# Patient Record
Sex: MALE | Race: WHITE | Employment: FULL TIME | URBAN - NONMETROPOLITAN AREA
[De-identification: names, ages, dates, MRNs, and addresses within clinical notes are randomized per-mention and may not be internally consistent; named-entity substitution may affect disease eponyms.]

---

## 2021-05-31 ENCOUNTER — HOSPITAL ENCOUNTER (EMERGENCY)
Facility: HOSPITAL | Age: 21
Discharge: HOME | End: 2021-06-01
Attending: PHYSICIAN ASSISTANT | Admitting: PHYSICIAN ASSISTANT
Payer: COMMERCIAL

## 2021-05-31 VITALS
SYSTOLIC BLOOD PRESSURE: 129 MMHG | TEMPERATURE: 98 F | HEART RATE: 102 BPM | DIASTOLIC BLOOD PRESSURE: 78 MMHG | OXYGEN SATURATION: 97 % | RESPIRATION RATE: 16 BRPM

## 2021-05-31 DIAGNOSIS — F43.23 ADJUSTMENT DISORDER WITH MIXED ANXIETY AND DEPRESSED MOOD: ICD-10-CM

## 2021-05-31 DIAGNOSIS — F10.10 ALCOHOL ABUSE: ICD-10-CM

## 2021-05-31 LAB
ALBUMIN SERPL-MCNC: 4.9 G/DL (ref 3.4–5)
ALP SERPL-CCNC: 75 U/L (ref 40–150)
ALT SERPL W P-5'-P-CCNC: 25 U/L (ref 0–70)
AMPHETAMINES UR QL: NOT DETECTED NG/ML
ANION GAP SERPL CALCULATED.3IONS-SCNC: 13 MMOL/L (ref 3–14)
APAP SERPL-MCNC: <2 MG/L (ref 10–20)
AST SERPL W P-5'-P-CCNC: 23 U/L (ref 0–45)
BARBITURATES UR QL SCN: NOT DETECTED NG/ML
BENZODIAZ UR QL SCN: NOT DETECTED NG/ML
BILIRUB SERPL-MCNC: 0.2 MG/DL (ref 0.2–1.3)
BUN SERPL-MCNC: 14 MG/DL (ref 7–30)
BUPRENORPHINE UR QL: NOT DETECTED NG/ML
CALCIUM SERPL-MCNC: 9.6 MG/DL (ref 8.5–10.1)
CANNABINOIDS UR QL: ABNORMAL NG/ML
CHLORIDE SERPL-SCNC: 110 MMOL/L (ref 94–109)
CO2 SERPL-SCNC: 21 MMOL/L (ref 20–32)
COCAINE UR QL SCN: NOT DETECTED NG/ML
CREAT SERPL-MCNC: 1.17 MG/DL (ref 0.66–1.25)
D-METHAMPHET UR QL: NOT DETECTED NG/ML
ETHANOL SERPL-MCNC: 0.24 G/DL
GFR SERPL CREATININE-BSD FRML MDRD: 88 ML/MIN/{1.73_M2}
GLUCOSE SERPL-MCNC: 93 MG/DL (ref 70–99)
METHADONE UR QL SCN: NOT DETECTED NG/ML
OPIATES UR QL SCN: NOT DETECTED NG/ML
OXYCODONE UR QL SCN: NOT DETECTED NG/ML
PCP UR QL SCN: NOT DETECTED NG/ML
POTASSIUM SERPL-SCNC: 3.6 MMOL/L (ref 3.4–5.3)
PROPOXYPH UR QL: NOT DETECTED NG/ML
PROT SERPL-MCNC: 8.5 G/DL (ref 6.8–8.8)
SALICYLATES SERPL-MCNC: 2 MG/DL
SODIUM SERPL-SCNC: 144 MMOL/L (ref 133–144)
TRICYCLICS UR QL SCN: NOT DETECTED NG/ML
TSH SERPL DL<=0.005 MIU/L-ACNC: 2.47 MU/L (ref 0.4–4)

## 2021-05-31 PROCEDURE — 84443 ASSAY THYROID STIM HORMONE: CPT | Performed by: PHYSICIAN ASSISTANT

## 2021-05-31 PROCEDURE — 80053 COMPREHEN METABOLIC PANEL: CPT | Performed by: PHYSICIAN ASSISTANT

## 2021-05-31 PROCEDURE — 36415 COLL VENOUS BLD VENIPUNCTURE: CPT | Performed by: PHYSICIAN ASSISTANT

## 2021-05-31 PROCEDURE — 99283 EMERGENCY DEPT VISIT LOW MDM: CPT | Performed by: PHYSICIAN ASSISTANT

## 2021-05-31 PROCEDURE — 80179 DRUG ASSAY SALICYLATE: CPT | Performed by: PHYSICIAN ASSISTANT

## 2021-05-31 PROCEDURE — 250N000013 HC RX MED GY IP 250 OP 250 PS 637: Performed by: PHYSICIAN ASSISTANT

## 2021-05-31 PROCEDURE — 99283 EMERGENCY DEPT VISIT LOW MDM: CPT

## 2021-05-31 PROCEDURE — 82077 ASSAY SPEC XCP UR&BREATH IA: CPT | Performed by: PHYSICIAN ASSISTANT

## 2021-05-31 PROCEDURE — 80306 DRUG TEST PRSMV INSTRMNT: CPT | Performed by: PHYSICIAN ASSISTANT

## 2021-05-31 PROCEDURE — 80143 DRUG ASSAY ACETAMINOPHEN: CPT | Performed by: PHYSICIAN ASSISTANT

## 2021-05-31 RX ORDER — NICOTINE 21 MG/24HR
1 PATCH, TRANSDERMAL 24 HOURS TRANSDERMAL DAILY
Status: DISCONTINUED | OUTPATIENT
Start: 2021-05-31 | End: 2021-06-01 | Stop reason: HOSPADM

## 2021-05-31 RX ADMIN — NICOTINE 1 PATCH: 21 PATCH, EXTENDED RELEASE TRANSDERMAL at 19:46
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
## 2021-06-01 NOTE — ED NOTES
Patient is awake and dressed. Continues to deny suicidal ideation. Patient is tearful and states he has family and wouldn't want to leave this earth. Patient states he plans to follow up with counseling as it has been helpful in the past. Awaiting ride. VSS.

## 2021-06-08 ENCOUNTER — HOSPITAL ENCOUNTER (EMERGENCY)
Facility: HOSPITAL | Age: 21
Discharge: HOME | End: 2021-06-08
Attending: PHYSICIAN ASSISTANT | Admitting: PHYSICIAN ASSISTANT
Payer: COMMERCIAL

## 2021-06-08 ENCOUNTER — APPOINTMENT (OUTPATIENT)
Dept: GENERAL RADIOLOGY | Facility: HOSPITAL | Age: 21
End: 2021-06-08
Attending: NURSE PRACTITIONER
Payer: COMMERCIAL

## 2021-06-08 VITALS
RESPIRATION RATE: 14 BRPM | HEART RATE: 62 BPM | DIASTOLIC BLOOD PRESSURE: 67 MMHG | TEMPERATURE: 97.7 F | OXYGEN SATURATION: 98 % | SYSTOLIC BLOOD PRESSURE: 118 MMHG

## 2021-06-08 DIAGNOSIS — M79.641 PAIN OF RIGHT HAND: ICD-10-CM

## 2021-06-08 PROCEDURE — G0463 HOSPITAL OUTPT CLINIC VISIT: HCPCS

## 2021-06-08 PROCEDURE — 73130 X-RAY EXAM OF HAND: CPT | Mod: RT

## 2021-06-08 PROCEDURE — 99213 OFFICE O/P EST LOW 20 MIN: CPT | Performed by: PHYSICIAN ASSISTANT

## 2021-06-08 NOTE — ED PROVIDER NOTES
History     Chief Complaint   Patient presents with     Hand Pain     rt hand pain x 1 week, notes injury due to punched a wall     The history is provided by the patient.     Raphael Aceves is a 20 year old male who presented to the urgent care ambulatory for evaluation of right hand pain.  Patient reports that he punched a wall approximately 1 week ago and has been experiencing pain across the dorsal aspect of his right hand since that time.  No other concerns.    Allergies:  No Known Allergies    Problem List:    There are no active problems to display for this patient.       Past Medical History:    No past medical history on file.    Past Surgical History:    No past surgical history on file.    Family History:    No family history on file.    Social History:  Marital Status:  Single [1]  Social History     Tobacco Use     Smoking status: Not on file   Substance Use Topics     Alcohol use: Not on file     Drug use: Not on file        Medications:    No current outpatient medications on file.        Review of Systems   Musculoskeletal:        Right hand pain   Skin: Negative.        Physical Exam   BP: 118/67  Pulse: 62  Temp: 97.7  F (36.5  C)  Resp: 14  SpO2: 98 %      Physical Exam  Vitals signs and nursing note reviewed.   Constitutional:       General: He is not in acute distress.     Appearance: Normal appearance. He is normal weight. He is not ill-appearing, toxic-appearing or diaphoretic.   Cardiovascular:      Rate and Rhythm: Normal rate and regular rhythm.   Musculoskeletal:      Comments: Semination of the right hand reveals no significant deformity or edema.  There is no erythema.  Some mild increasing tenderness upon palpation of the second and third metacarpals.  Seems to have normal range of motion of the wrist.  Normal range of motion of the digits.  Pulses are intact.   Skin:     General: Skin is warm and dry.      Capillary Refill: Capillary refill takes less than 2 seconds.    Neurological:      General: No focal deficit present.      Mental Status: He is alert and oriented to person, place, and time.         ED Course        Procedures               Critical Care time:  none               Results for orders placed or performed during the hospital encounter of 06/08/21 (from the past 24 hour(s))   XR Hand Right G/E 3 Views    Narrative    PROCEDURE:  XR HAND RT G/E 3 VW    HISTORY: punched a wall    COMPARISON:  None.    TECHNIQUE:  3 views of the right hand were obtained.    FINDINGS:  No fracture or dislocation is identified. The joint spaces  are preserved.        Impression    IMPRESSION: Normal right hand      JOB ARIAS MD       Medications - No data to display    Assessments & Plan (with Medical Decision Making)   Discussed findings on examination.  X-ray of the right hand reveals no evidence of fracture or dislocation.  He will buy a splint over-the-counter and wear it for 7 days.  Please see discharge instructions.  Return here for any other questions or concerns.  Discussed that other injuries may be apparent with a normal x-ray.  Ibuprofen and Tylenol as needed.  Return here as needed.    This document was prepared using a combination of typing and voice generated software.  While every attempt was made for accuracy, spelling and grammatical errors may exist.    I have reviewed the nursing notes.    I have reviewed the findings, diagnosis, plan and need for follow up with the patient.       There are no discharge medications for this patient.      Final diagnoses:   Pain of right hand       6/8/2021   [facility]     Matilde Steel PA-C  06/08/21 1537

## 2021-06-08 NOTE — DISCHARGE INSTRUCTIONS
As we discussed, your x-ray today shows no evidence of fracture or dislocation.  This does not mean that there is no injury.  You can have multiple injuries that are not visible on x-ray.  Splint your wrist and hand for the next 7 days.  No lifting greater than 10 pounds for the next 7 days.  Follow-up in the clinic for persistent pain.  Return here for any new symptoms, worsening symptoms, or other concerns.   Detail Level: Simple Other (Free Text): Healing well minimal drainage RTC in one week for suture removal Note Text (......Xxx Chief Complaint.): This diagnosis correlates with the

## 2021-07-29 ENCOUNTER — HOSPITAL ENCOUNTER (EMERGENCY)
Facility: HOSPITAL | Age: 21
Discharge: HOME | End: 2021-07-29
Attending: EMERGENCY MEDICINE | Admitting: EMERGENCY MEDICINE
Payer: COMMERCIAL

## 2021-07-29 ENCOUNTER — TELEPHONE (OUTPATIENT)
Dept: EMERGENCY MEDICINE | Facility: HOSPITAL | Age: 21
End: 2021-07-29

## 2021-07-29 VITALS
SYSTOLIC BLOOD PRESSURE: 125 MMHG | OXYGEN SATURATION: 96 % | TEMPERATURE: 99.1 F | RESPIRATION RATE: 16 BRPM | HEART RATE: 59 BPM | DIASTOLIC BLOOD PRESSURE: 62 MMHG

## 2021-07-29 DIAGNOSIS — G47.00 INSOMNIA, UNSPECIFIED TYPE: ICD-10-CM

## 2021-07-29 DIAGNOSIS — F41.9 ANXIETY: ICD-10-CM

## 2021-07-29 PROCEDURE — 99282 EMERGENCY DEPT VISIT SF MDM: CPT | Performed by: EMERGENCY MEDICINE

## 2021-07-29 PROCEDURE — 99283 EMERGENCY DEPT VISIT LOW MDM: CPT

## 2021-07-29 PROCEDURE — 250N000013 HC RX MED GY IP 250 OP 250 PS 637: Performed by: EMERGENCY MEDICINE

## 2021-07-29 RX ORDER — HYDROXYZINE PAMOATE 25 MG/1
25 CAPSULE ORAL AT BEDTIME
Qty: 15 CAPSULE | Refills: 0 | Status: SHIPPED | OUTPATIENT
Start: 2021-07-29 | End: 2021-08-27

## 2021-07-29 RX ORDER — HYDROXYZINE HYDROCHLORIDE 25 MG/1
25 TABLET, FILM COATED ORAL ONCE
Status: COMPLETED | OUTPATIENT
Start: 2021-07-29 | End: 2021-07-29

## 2021-07-29 RX ADMIN — HYDROXYZINE HYDROCHLORIDE 25 MG: 25 TABLET, FILM COATED ORAL at 01:42

## 2021-07-29 NOTE — ED TRIAGE NOTES
Pt here for 12 year history of insomnia. Pt has new job and is worried this insomnia is going to cause issues with keeping this job. Pt is here looking for medicine to help him sleep tonight.

## 2021-07-29 NOTE — ED PROVIDER NOTES
History     Chief Complaint   Patient presents with     Insomnia     HPI  Raphael Aceves is a 20 year old male who presents with insomnia.  He has a history of anxiety, history of substance use disorder in remission.  He reports that he has had insomnia for many years and has tried multiple different medications for it.  He is recently moved to the area and has a new job but had to call in today as he was unable to sleep the night before.  He is feeling quite anxious and emotional about the insomnia as he is not sure that it will ever get better.  He reports that before bed he usually watches TV until his girlfriend is ready to go to sleep and then he lies awake for a long time.  He estimates he gets about 4 hours of sleep per night.  He reports he has trouble controlling his thoughts at night, does not think about anything in particular, could be anything.  He reports feeling exhausted but otherwise has no physical complaints or pain, no recent illnesses, fever.  He reports feeling of hopelessness about his insomnia but denies SI/HI, does not feel depressed at this time, does feel emotional about his inability to sleep.  He reports that he recently discontinued smoking marijuana a week ago, denies alcohol use.    Allergies:  No Known Allergies    Problem List:    There are no problems to display for this patient.       Past Medical History:    History reviewed. No pertinent past medical history.    Past Surgical History:    No past surgical history on file.    Family History:    No family history on file.    Social History:  Marital Status:  Single [1]  Social History     Tobacco Use     Smoking status: None   Substance Use Topics     Alcohol use: None     Drug use: None        Medications:    hydrOXYzine (VISTARIL) 25 MG capsule          Review of Systems    Physical Exam   BP: 125/62  Pulse: 59  Temp: 99.1  F (37.3  C)  Resp: 16  SpO2: 96 %      Physical Exam  Constitutional:       General: He is not in  acute distress.     Appearance: He is not ill-appearing.   HENT:      Head: Normocephalic and atraumatic.      Mouth/Throat:      Mouth: Mucous membranes are moist.      Pharynx: Oropharynx is clear.   Eyes:      Conjunctiva/sclera: Conjunctivae normal.      Pupils: Pupils are equal, round, and reactive to light.   Cardiovascular:      Rate and Rhythm: Normal rate and regular rhythm.   Pulmonary:      Effort: Pulmonary effort is normal.      Breath sounds: Normal breath sounds.   Abdominal:      Palpations: Abdomen is soft.      Tenderness: There is no abdominal tenderness.   Musculoskeletal:      Cervical back: Normal range of motion.      Comments: 5/5 strength upper and lower extremities    Skin:     General: Skin is warm and dry.   Neurological:      Mental Status: He is alert and oriented to person, place, and time.      Comments: Cranial nerves grossly intact, gait steady         ED Course        Procedures              Critical Care time:  none               No results found for this or any previous visit (from the past 24 hour(s)).    Medications   hydrOXYzine (ATARAX) tablet 25 mg (25 mg Oral Given 7/29/21 0142)       Assessments & Plan (with Medical Decision Making)     I have reviewed the nursing notes.    I have reviewed the findings, diagnosis, plan and need for follow up with the patient.   Mr. Aceves is a 20-year-old man who presents with long-term insomnia and associated anxieties about his job.  Had a long discussion about previous attempts to help with his insomnia.  Discussed sleep hygiene including avoiding screens 2 hours before bed, avoiding stimulants, pursuing light exercise, keeping room dark, and restarting melatonin.  He was initially somewhat upset about this because he says he has tried these things in the past, though he has not tried sleep hygiene at home, only when he is in inpatient settings.  We discussed that these were the necessary first steps in addressing insomnia, that he  may require counseling and cognitive behavioral therapy due to his history of anxiety and difficulty controlling his thoughts at night, history of some trauma.  He is interested in this and would like outpatient resources which we can provide.  He does not have a primary care provider so will contact social work to establish care.  Will provide primary care referral.  Discussed that medications are not a long-term solution for insomnia.  Will start with melatonin 1 mg and hydroxyzine once a night 25 mg for first week and then 50 mg if no improvement in the second week.  We also discussed tactical or box breathing for when he is having difficulty sleeping at night.  Primary care provider will address any further recommendations.  Return precautions discussed as detailed in the AVS.  He expressed understanding.    There are no discharge medications for this patient.      Final diagnoses:   Anxiety   Insomnia, unspecified type       7/29/2021   [facility]     Bebe Guardado MD  07/29/21 0224

## 2021-07-29 NOTE — TELEPHONE ENCOUNTER
Care Transitions focused note:      Chart reviewed, staff message to assist patient with establishing primary care.  Appt made with Dr Jaqueline Liriano on Thursday Aug 26th at 1:45 pm.    Message sent to patient via text as he had no voicemail.    Will await call back.    ROC Geronimo

## 2021-07-29 NOTE — ED NOTES
Pt given mental health resources as well as crisis phone numbers. Pt educated on taking atarax and melatonin at bedtime. Message sent to case management to help with PCP.

## 2021-07-29 NOTE — DISCHARGE INSTRUCTIONS
Do not look at screens of any kind for 2 hours prior to bed.  Do not bring any screens in your bedroom.  Do not have a TV in your bedroom.  You should get 30 minutes of moderate exercise per day.  This should be at least 3 hours prior to going to bed.  You should avoid caffeine  You should not start using alcohol  Your room should be completely dark--use blackout curtains or cover your windows with tinfoil.  You can use a sleep mask and earplugs.    Use mental tricks to stop your mind from spinning.  You can do things like picturing your thoughts going into a balloon and floating away.  You can also use combat breathing which is breathing in for 4 seconds, holding for 4 seconds, breathing out for 4 seconds, and holding for 4 seconds.  This can help slow your thoughts and help you relax  You should start taking melatonin every night 30 minutes to an hour before you go to bed.  Start with 1 mg per night  You can take Atarax nightly for the next week.  This will make you sleepy.  Do not combine with alcohol  Follow-up with your primary doctor soon as you can.  I will place referral.  Seek outpatient mental health counseling for anxiety.  Anxiety medications may benefit you.    Return to the emergency department for any new or concerning symptoms

## 2021-08-27 ENCOUNTER — HOSPITAL ENCOUNTER (EMERGENCY)
Facility: HOSPITAL | Age: 21
Discharge: HOME | End: 2021-08-27
Attending: EMERGENCY MEDICINE | Admitting: EMERGENCY MEDICINE
Payer: COMMERCIAL

## 2021-08-27 VITALS
OXYGEN SATURATION: 98 % | DIASTOLIC BLOOD PRESSURE: 86 MMHG | TEMPERATURE: 96.3 F | RESPIRATION RATE: 16 BRPM | HEART RATE: 81 BPM | SYSTOLIC BLOOD PRESSURE: 150 MMHG

## 2021-08-27 DIAGNOSIS — K52.9 GASTROENTERITIS: ICD-10-CM

## 2021-08-27 PROCEDURE — 250N000011 HC RX IP 250 OP 636: Performed by: EMERGENCY MEDICINE

## 2021-08-27 PROCEDURE — 99283 EMERGENCY DEPT VISIT LOW MDM: CPT

## 2021-08-27 PROCEDURE — 99283 EMERGENCY DEPT VISIT LOW MDM: CPT | Performed by: EMERGENCY MEDICINE

## 2021-08-27 RX ORDER — ONDANSETRON 4 MG/1
4 TABLET, ORALLY DISINTEGRATING ORAL ONCE
Status: COMPLETED | OUTPATIENT
Start: 2021-08-27 | End: 2021-08-27

## 2021-08-27 RX ORDER — ONDANSETRON 4 MG/1
4 TABLET, ORALLY DISINTEGRATING ORAL EVERY 8 HOURS PRN
Qty: 10 TABLET | Refills: 0 | Status: SHIPPED | OUTPATIENT
Start: 2021-08-27 | End: 2021-08-30

## 2021-08-27 RX ADMIN — ONDANSETRON 4 MG: 4 TABLET, ORALLY DISINTEGRATING ORAL at 20:49

## 2021-08-27 ASSESSMENT — ENCOUNTER SYMPTOMS
FEVER: 0
CHILLS: 0
COUGH: 0
SHORTNESS OF BREATH: 0

## 2021-08-27 NOTE — LETTER
August 27, 2021      To Whom It May Concern:      Raphael Aceves was seen in our Emergency Department today, 08/27/21.  I expect his condition to improve over the next few days.  He may return to work/school when he is no longer symptomatic.    Sincerely,        Clement Robb MD

## 2021-08-27 NOTE — ED TRIAGE NOTES
Patient presents with complaints of diarrhea for the last 2 weeks. States the nausea and vomiting started today. He's thrown up 4X today

## 2021-08-28 NOTE — ED NOTES
"Pt ambulated to room. Pt states he had thrown up today and felt unwell and had diarrhea today but pt also thinks he had bad food last night causing him to feel unwell this am.  However, pt states the last week he has been having \"the runs\". This is not usual. Pt is Unsure if he has had fevers-does c/o flashes of hot and cold.  Pt does have lower abd discomfort.   Pt is intermittently nauseous. Pt has been in the waiting room for 2 hours and has been able tyo keep a vitamin water and snack bag of chips down well. Pt states he is feeling much better at this time.   "

## 2023-07-23 ENCOUNTER — HOSPITAL ENCOUNTER (EMERGENCY)
Facility: HOSPITAL | Age: 23
Discharge: HOME | End: 2023-07-23
Attending: PHYSICIAN ASSISTANT | Admitting: PHYSICIAN ASSISTANT
Payer: COMMERCIAL

## 2023-07-23 VITALS
HEART RATE: 75 BPM | RESPIRATION RATE: 18 BRPM | SYSTOLIC BLOOD PRESSURE: 123 MMHG | OXYGEN SATURATION: 97 % | TEMPERATURE: 98.3 F | DIASTOLIC BLOOD PRESSURE: 76 MMHG

## 2023-07-23 DIAGNOSIS — F41.9 ANXIETY: ICD-10-CM

## 2023-07-23 PROCEDURE — 99283 EMERGENCY DEPT VISIT LOW MDM: CPT

## 2023-07-23 PROCEDURE — 250N000013 HC RX MED GY IP 250 OP 250 PS 637: Performed by: PHYSICIAN ASSISTANT

## 2023-07-23 PROCEDURE — 99283 EMERGENCY DEPT VISIT LOW MDM: CPT | Performed by: PHYSICIAN ASSISTANT

## 2023-07-23 RX ORDER — HYDROXYZINE HYDROCHLORIDE 25 MG/1
25 TABLET, FILM COATED ORAL ONCE
Status: COMPLETED | OUTPATIENT
Start: 2023-07-23 | End: 2023-07-23

## 2023-07-23 RX ORDER — HYDROXYZINE HYDROCHLORIDE 25 MG/1
25-50 TABLET, FILM COATED ORAL 3 TIMES DAILY PRN
Qty: 15 TABLET | Refills: 0 | Status: SHIPPED | OUTPATIENT
Start: 2023-07-23

## 2023-07-23 RX ADMIN — HYDROXYZINE HYDROCHLORIDE 25 MG: 25 TABLET, FILM COATED ORAL at 21:36

## 2023-07-23 ASSESSMENT — ENCOUNTER SYMPTOMS
NERVOUS/ANXIOUS: 1
NEUROLOGICAL NEGATIVE: 1
CONSTITUTIONAL NEGATIVE: 1
CARDIOVASCULAR NEGATIVE: 1
RESPIRATORY NEGATIVE: 1

## 2023-07-23 NOTE — LETTER
July 23, 2023      To Whom It May Concern:      Raphael Aceves was seen in our Emergency Department today, 07/23/23.  He has started treatment for a condition starting on 7/22/23.  He may return to work/school, but please excuse him from any missed work over the past 24 hours.         Sincerely,        ESTEPHANIA Campbell

## 2023-07-24 NOTE — DISCHARGE INSTRUCTIONS
Continue to work on deep breathing exercises.   May use the Atarax every 6 hours as needed for these episodes.   Back here vs your provider with ongoing concerns.

## 2023-07-24 NOTE — ED PROVIDER NOTES
History     Chief Complaint   Patient presents with     Anxiety     HPI  Raphael Aceves is a 22 year old male who reports increase anxiety, social/financial stressors. After missing work, worried about loss of job. He is currently experiencing intermittent episodes panic when he can't keep his mind off of stressors. This is also impacting sleep, which he has been working on with MH provider of whom he sees in August. (VS stable in triage).     Allergies:  No Known Allergies    Problem List:    There are no problems to display for this patient.       Past Medical History:    History reviewed. No pertinent past medical history.    Past Surgical History:    History reviewed. No pertinent surgical history.    Family History:    History reviewed. No pertinent family history.    Social History:  Marital Status:  Single [1]  Social History     Tobacco Use     Smoking status: Every Day     Packs/day: 0.50     Types: Vaping Device, Cigarettes     Smokeless tobacco: Never   Substance Use Topics     Alcohol use: Not Currently     Drug use: Not Currently        Medications:    hydrOXYzine (ATARAX) 25 MG tablet          Review of Systems   Constitutional: Negative.    Respiratory: Negative.    Cardiovascular: Negative.    Skin: Negative.    Neurological: Negative.    Psychiatric/Behavioral: The patient is nervous/anxious.        Physical Exam   BP: 123/76  Pulse: 75  Temp: 98.3  F (36.8  C)  Resp: 18  SpO2: 97 %      Physical Exam  Vitals and nursing note reviewed.   Constitutional:       General: He is not in acute distress.     Appearance: He is not toxic-appearing.   Cardiovascular:      Rate and Rhythm: Normal rate.   Pulmonary:      Effort: Pulmonary effort is normal.   Skin:     General: Skin is warm and dry.   Neurological:      Mental Status: He is alert and oriented to person, place, and time.   Psychiatric:         Mood and Affect: Mood is anxious.      Comments: Mood and affect are congruent with reported  stressors.          ED Course     No results found for this or any previous visit (from the past 24 hour(s)).  Medications   hydrOXYzine (ATARAX) tablet 25 mg (25 mg Oral $Given 7/23/23 2136)       Assessments & Plan (with Medical Decision Making)     I have reviewed the nursing notes.  I have reviewed the findings, diagnosis, plan and need for follow up with the patient.    Discharge Medication List as of 7/23/2023  9:58 PM      START taking these medications    Details   hydrOXYzine (ATARAX) 25 MG tablet Take 1-2 tablets (25-50 mg) by mouth 3 times daily as needed for itching, Disp-15 tablet, R-0, E-Prescribe             Final diagnoses:   Anxiety   Pt received hydroxyzine in office. No ASE prior to discharge. I did write for 15 tabs 25 mg to use Q6 PRN until his visit with MH. If sx unmanageable, he will seek care here/ER sooner. Pt agreeable to plan and discharged home stable.     7/23/2023   [facility]     Mason Hercules PA  07/23/23 9815

## 2023-07-24 NOTE — ED TRIAGE NOTES
Patient presents with c/o increased stress and anxiety. Does see someone for mental health but unable to see until mid August. Patient reports increased financial stress. Also missed work Saturday and worried about losing job, needs work note.

## 2025-01-05 ENCOUNTER — HOSPITAL ENCOUNTER (EMERGENCY)
Facility: HOSPITAL | Age: 25
Discharge: HOME | End: 2025-01-05
Attending: PHYSICIAN ASSISTANT | Admitting: PHYSICIAN ASSISTANT

## 2025-01-05 VITALS
DIASTOLIC BLOOD PRESSURE: 84 MMHG | SYSTOLIC BLOOD PRESSURE: 127 MMHG | WEIGHT: 146 LBS | OXYGEN SATURATION: 99 % | HEIGHT: 66 IN | TEMPERATURE: 97.7 F | BODY MASS INDEX: 23.46 KG/M2 | HEART RATE: 102 BPM | RESPIRATION RATE: 16 BRPM

## 2025-01-05 DIAGNOSIS — F41.9 ANXIETY: ICD-10-CM

## 2025-01-05 DIAGNOSIS — Z76.0 ENCOUNTER FOR MEDICATION REFILL: ICD-10-CM

## 2025-01-05 PROCEDURE — 99213 OFFICE O/P EST LOW 20 MIN: CPT | Performed by: PHYSICIAN ASSISTANT

## 2025-01-05 PROCEDURE — G0463 HOSPITAL OUTPT CLINIC VISIT: HCPCS

## 2025-01-05 RX ORDER — LORAZEPAM 2 MG/1
TABLET ORAL
COMMUNITY
Start: 2024-12-18

## 2025-01-05 RX ORDER — LORAZEPAM 1 MG/1
1 TABLET ORAL EVERY 6 HOURS PRN
Qty: 10 TABLET | Refills: 0 | Status: SHIPPED | OUTPATIENT
Start: 2025-01-05

## 2025-01-05 ASSESSMENT — COLUMBIA-SUICIDE SEVERITY RATING SCALE - C-SSRS
6. HAVE YOU EVER DONE ANYTHING, STARTED TO DO ANYTHING, OR PREPARED TO DO ANYTHING TO END YOUR LIFE?: NO
1. IN THE PAST MONTH, HAVE YOU WISHED YOU WERE DEAD OR WISHED YOU COULD GO TO SLEEP AND NOT WAKE UP?: NO
2. HAVE YOU ACTUALLY HAD ANY THOUGHTS OF KILLING YOURSELF IN THE PAST MONTH?: NO

## 2025-01-05 NOTE — ED TRIAGE NOTES
Pt presents with anxiety, worrying, outburst x2 days. PT stated he went on a trip and left his medication and has been unable to take it. Pt takes ativan

## 2025-01-05 NOTE — ED TRIAGE NOTES
JANUSZ Steel  assessed patient in triage and determined patient Urgent Care appropriate. Will be seen in Urgent Care.

## 2025-01-05 NOTE — ED PROVIDER NOTES
"  History     Chief Complaint   Patient presents with    Anxiety     The history is provided by the patient.     Raphael Aceves is a 24 year old male who presented to the urgent care ambulatory for evaluation of medication refill.  The patient reports that he is chronically on Ativan at 3 mg a day.  He was recently on a trip and forgot his Ativan.  He is having increasing anxiety.  His medications are filled through [facility]    Allergies:  No Known Allergies    Problem List:    There are no active problems to display for this patient.       Past Medical History:    No past medical history on file.    Past Surgical History:    No past surgical history on file.    Family History:    No family history on file.    Social History:  Marital Status:  Single [1]  Social History     Tobacco Use    Smoking status: Every Day     Current packs/day: 0.50     Types: Vaping Device, Cigarettes    Smokeless tobacco: Never   Substance Use Topics    Alcohol use: Not Currently    Drug use: Not Currently        Medications:    LORazepam (ATIVAN) 1 MG tablet  LORazepam (ATIVAN) 2 MG tablet  hydrOXYzine (ATARAX) 25 MG tablet          Review of Systems   Psychiatric/Behavioral:          See HPI       Physical Exam   BP: 127/84  Pulse: 102  Temp: 97.7  F (36.5  C)  Resp: 16  Height: 167.6 cm (5' 6\")  Weight: 66.2 kg (146 lb)  SpO2: 99 %      Physical Exam  Vitals and nursing note reviewed.   Constitutional:       Appearance: Normal appearance. He is normal weight.   Neurological:      General: No focal deficit present.      Mental Status: He is alert and oriented to person, place, and time.   Psychiatric:         Mood and Affect: Mood normal.         Behavior: Behavior normal.      Comments: No evidence of psychosis, delusions, or flight of ideas         ED Course        Procedures              Critical Care time:  none              No results found for this or any previous visit (from the past 24 hours).    Medications - No " data to display    Assessments & Plan (with Medical Decision Making)    verified.  No concerning features on this patient.  Reasonable for a short course of Ativan until he can see his medication manager.  Return here as needed.    This document was prepared using a combination of typing and voice generated software.  While every attempt was made for accuracy, spelling and grammatical errors may exist.     I have reviewed the nursing notes.    I have reviewed the findings, diagnosis, plan and need for follow up with the patient.           Medical Decision Making  The patient's presentation was of straightforward complexity (a clearly self-limited or minor problem).    The patient's evaluation involved:  history and exam without other MDM data elements    The patient's management necessitated moderate risk (prescription drug management including medications given in the ED).        New Prescriptions    LORAZEPAM (ATIVAN) 1 MG TABLET    Take 1 tablet (1 mg) by mouth every 6 hours as needed for anxiety.       Final diagnoses:   Anxiety   Encounter for medication refill       1/5/2025   [facility]       Matilde Steel PA-C  01/05/25 5105

## 2025-02-02 ENCOUNTER — HOSPITAL ENCOUNTER (EMERGENCY)
Facility: HOSPITAL | Age: 25
Discharge: HOME | End: 2025-02-02
Attending: NURSE PRACTITIONER

## 2025-02-02 ENCOUNTER — APPOINTMENT (OUTPATIENT)
Dept: GENERAL RADIOLOGY | Facility: HOSPITAL | Age: 25
End: 2025-02-02
Attending: NURSE PRACTITIONER

## 2025-02-02 VITALS
SYSTOLIC BLOOD PRESSURE: 138 MMHG | DIASTOLIC BLOOD PRESSURE: 91 MMHG | TEMPERATURE: 97.9 F | RESPIRATION RATE: 15 BRPM | HEART RATE: 114 BPM | OXYGEN SATURATION: 97 %

## 2025-02-02 DIAGNOSIS — S90.211A SUBUNGUAL HEMATOMA OF GREAT TOE OF RIGHT FOOT, INITIAL ENCOUNTER: ICD-10-CM

## 2025-02-02 DIAGNOSIS — S99.921A TOE INJURY, RIGHT, INITIAL ENCOUNTER: Primary | ICD-10-CM

## 2025-02-02 PROCEDURE — 73630 X-RAY EXAM OF FOOT: CPT | Mod: RT

## 2025-02-02 PROCEDURE — 250N000013 HC RX MED GY IP 250 OP 250 PS 637: Performed by: NURSE PRACTITIONER

## 2025-02-02 PROCEDURE — 96372 THER/PROPH/DIAG INJ SC/IM: CPT | Performed by: NURSE PRACTITIONER

## 2025-02-02 PROCEDURE — 99213 OFFICE O/P EST LOW 20 MIN: CPT | Performed by: NURSE PRACTITIONER

## 2025-02-02 PROCEDURE — 250N000009 HC RX 250: Performed by: NURSE PRACTITIONER

## 2025-02-02 PROCEDURE — G0463 HOSPITAL OUTPT CLINIC VISIT: HCPCS | Mod: 25

## 2025-02-02 PROCEDURE — 250N000011 HC RX IP 250 OP 636: Performed by: NURSE PRACTITIONER

## 2025-02-02 RX ORDER — OLANZAPINE 5 MG/1
TABLET ORAL
COMMUNITY
Start: 2024-02-26

## 2025-02-02 RX ORDER — KETOROLAC TROMETHAMINE 10 MG/1
10 TABLET, FILM COATED ORAL EVERY 6 HOURS PRN
Qty: 20 TABLET | Refills: 0 | Status: SHIPPED | OUTPATIENT
Start: 2025-02-02

## 2025-02-02 RX ORDER — CLONAZEPAM 0.5 MG/1
0.5 TABLET ORAL 2 TIMES DAILY PRN
COMMUNITY
Start: 2024-10-08

## 2025-02-02 RX ORDER — LAMOTRIGINE 25 MG/1
TABLET ORAL
COMMUNITY
Start: 2025-01-30

## 2025-02-02 RX ORDER — ESZOPICLONE 2 MG/1
2 TABLET, FILM COATED ORAL AT BEDTIME
COMMUNITY

## 2025-02-02 RX ORDER — ACETAMINOPHEN 325 MG/1
650 TABLET ORAL ONCE
Status: COMPLETED | OUTPATIENT
Start: 2025-02-02 | End: 2025-02-02

## 2025-02-02 RX ORDER — ESZOPICLONE 1 MG/1
1 TABLET, FILM COATED ORAL AT BEDTIME
COMMUNITY
Start: 2024-10-20

## 2025-02-02 RX ORDER — KETOROLAC TROMETHAMINE 30 MG/ML
30 INJECTION, SOLUTION INTRAMUSCULAR; INTRAVENOUS ONCE
Status: COMPLETED | OUTPATIENT
Start: 2025-02-02 | End: 2025-02-02

## 2025-02-02 RX ADMIN — LIDOCAINE HYDROCHLORIDE 10 ML: 10 INJECTION, SOLUTION EPIDURAL; INFILTRATION; INTRACAUDAL; PERINEURAL at 13:15

## 2025-02-02 RX ADMIN — KETOROLAC TROMETHAMINE 30 MG: 30 INJECTION, SOLUTION INTRAMUSCULAR at 13:12

## 2025-02-02 RX ADMIN — ACETAMINOPHEN 650 MG: 325 TABLET, FILM COATED ORAL at 13:12

## 2025-02-02 ASSESSMENT — ACTIVITIES OF DAILY LIVING (ADL)
ADLS_ACUITY_SCORE: 41
ADLS_ACUITY_SCORE: 41

## 2025-02-02 ASSESSMENT — ENCOUNTER SYMPTOMS
MYALGIAS: 1
COLOR CHANGE: 1
JOINT SWELLING: 1

## 2025-02-02 NOTE — DISCHARGE INSTRUCTIONS
Keep your toe in the area around the clean and dry.  Apply triple antibiotic ointment over the area that are open today.    Take the antibiotic and pain medication as prescribed.    Follow-up in the clinic as needed.  Return to urgent care or emergency department for any worsening or concerning symptoms.

## 2025-02-02 NOTE — ED PROVIDER NOTES
History     Chief Complaint   Patient presents with    Toe Pain     HPI  Raphael Aceves is a 24 year old male who presents to urgent care for evaluation of right great toe pain.  Patient tells me that in the last 1 to 2 days he stubbed his toe against an end table.  He was out with his friends and thinks one of them may have also stepped on his toe.  He noticed swelling and discoloration to his right great toe yesterday.  He has throbbing pain to this toe.  He has been taking ibuprofen for his pain with last dose taken this morning.    Allergies:  No Known Allergies    Problem List:    There are no active problems to display for this patient.       Past Medical History:    History reviewed. No pertinent past medical history.    Past Surgical History:    History reviewed. No pertinent surgical history.    Family History:    History reviewed. No pertinent family history.    Social History:  Marital Status:  Single [1]  Social History     Tobacco Use    Smoking status: Every Day     Current packs/day: 0.50     Types: Vaping Device, Cigarettes    Smokeless tobacco: Never   Substance Use Topics    Alcohol use: Not Currently    Drug use: Not Currently        Medications:    amoxicillin-clavulanate (AUGMENTIN) 875-125 MG tablet  clonazePAM (KLONOPIN) 0.5 MG tablet  eszopiclone (LUNESTA) 1 MG tablet  ketorolac (TORADOL) 10 MG tablet  lamoTRIgine (LAMICTAL) 25 MG tablet  OLANZapine (ZYPREXA) 5 MG tablet  eszopiclone (LUNESTA) 2 MG tablet  hydrOXYzine (ATARAX) 25 MG tablet  LORazepam (ATIVAN) 1 MG tablet  LORazepam (ATIVAN) 2 MG tablet          Review of Systems   Musculoskeletal:  Positive for joint swelling and myalgias.   Skin:  Positive for color change.   All other systems reviewed and are negative.      Physical Exam   BP: (!) 138/91  Pulse: 114  Temp: 97.9  F (36.6  C)  Resp: 15  SpO2: 97 %      Physical Exam  Vitals and nursing note reviewed.   Constitutional:       Appearance: Normal appearance. He is not  ill-appearing or toxic-appearing.   HENT:      Head: Atraumatic.   Cardiovascular:      Rate and Rhythm: Normal rate.      Pulses:           Posterior tibial pulses are 2+ on the right side.   Pulmonary:      Effort: Pulmonary effort is normal. No respiratory distress.   Musculoskeletal:      Cervical back: Neck supple.   Feet:      Comments: Significant subungual hematoma to right great toe with mild redness around the nailbed.  Appears to be purulence around the proximal nail fold.  Tenderness to palpation to the distal phalanx as well as over the MTP joint of right great toe.  No tenderness to palpation of the rest of the foot.  2 seconds.    No obvious deformity to the right great toe.  Skin:     General: Skin is warm and dry.      Capillary Refill: Capillary refill takes less than 2 seconds.   Neurological:      Mental Status: He is alert and oriented to person, place, and time.         ED Course        Procedures  Digital block using lidocaine 1% without epinephrine was completed to the right great toe.  Right great toe was fully anesthetized I used an 11 inch blade to make a stab incision near the proximal nail fold.  There was immediate bloody drainage from this area.  Continued to apply gentle pressure around the nail and nailbed and was able to drain a moderate amount of blood.  No purulent drainage appreciated.  Toe was cleaned with sterile water, dressing applied.  Patient tolerated well.       Results for orders placed or performed during the hospital encounter of 02/02/25 (from the past 24 hours)   Foot  XR, G/E 3 views, right    Narrative    EXAM: XR FOOT RIGHT G/E 3 VIEWS  LOCATION: [facility]  DATE: 2/2/2025    INDICATION: stubbed toe and someone possibly stepped on it inthe lst 1 2 days; subungual hematoma with what looks like purulunce to the nail bed; swelling and pain to MTP of great toe and distal phalanx; concern for fracture  COMPARISON: None.      Impression    IMPRESSION: Type  II accessory navicular. The bones appear normal otherwise. No fracture. No evidence of osteomyelitis. Soft tissue swelling of the great toe.       Medications   ketorolac (TORADOL) injection 30 mg (30 mg Intramuscular $Given 2/2/25 1312)   acetaminophen (TYLENOL) tablet 650 mg (650 mg Oral $Given 2/2/25 1312)   lidocaine 1 % 10 mL (10 mLs Intradermal $Given 2/2/25 1315)       Assessments & Plan (with Medical Decision Making)   This is a 24-year-old male that presented for evaluation of right great toe injury with significant swelling and discoloration.  On evaluation it appears the patient had a subungual hematoma with some concern for purulence and visualize the proximal nail fold.  X-rays were negative for acute fractures or any acute findings other than soft tissue swelling of the great toe.  A digital block was done to anesthetize the right great toe and a stab incision was made over the proximal nail fold.  I managed to drain out a moderate amount of bloody drainage.  No purulent drainage appreciated.  Wound was cleaned and dressed with patient tolerating well.  Patient reports that his Tdap was up-to-date within the last 3 years.  He recently moved from [place].    Patient had a subungual hematoma.  Will treat with Augmentin prophylactically.  Toradol as prescribed to help with pain.  He was advised to keep his toe clean and dry.  Follow up in the clinic as needed.  Return to urgent care or emergency department for any worsening or concerning symptoms.    I have reviewed the nursing notes.    I have reviewed the findings, diagnosis, plan and need for follow up with the patient.  This document was prepared using a combination of typing and voice generated software.  While every attempt was made for accuracy, spelling and grammatical errors may exist.         New Prescriptions    AMOXICILLIN-CLAVULANATE (AUGMENTIN) 875-125 MG TABLET    Take 1 tablet by mouth 2 times daily for 5 days.    KETOROLAC (TORADOL) 10  MG TABLET    Take 1 tablet (10 mg) by mouth every 6 hours as needed for moderate pain.       Final diagnoses:   Toe injury, right, initial encounter   Subungual hematoma of great toe of right foot, initial encounter       2/2/2025   [facility]       Reji, Ya, CNP  02/02/25 8015

## 2025-02-02 NOTE — ED TRIAGE NOTES
Pt presents with c/o right big toe. Reports he is unsure what happened. Unsure if someone stepped on it or if something was dropped on it. States he is getting shooting pain that goes up to his knee. Pain started yesterday. No otc meds.

## 2025-04-08 ENCOUNTER — HOSPITAL ENCOUNTER (INPATIENT)
Facility: HOSPITAL | Age: 25
LOS: 2 days | Discharge: HOME | End: 2025-04-10
Attending: NURSE PRACTITIONER | Admitting: PSYCHIATRY & NEUROLOGY

## 2025-04-08 ENCOUNTER — TELEPHONE (OUTPATIENT)
Dept: BEHAVIORAL HEALTH | Facility: CLINIC | Age: 25
End: 2025-04-08

## 2025-04-08 DIAGNOSIS — S81.812A LACERATION OF LEFT LOWER EXTREMITY, INITIAL ENCOUNTER: ICD-10-CM

## 2025-04-08 DIAGNOSIS — S81.811A LACERATION OF RIGHT LOWER EXTREMITY, INITIAL ENCOUNTER: ICD-10-CM

## 2025-04-08 DIAGNOSIS — Z72.89 DELIBERATE SELF-CUTTING: ICD-10-CM

## 2025-04-08 DIAGNOSIS — F31.4 BIPOLAR DISORDER, CURRENT EPISODE DEPRESSED, SEVERE, WITHOUT PSYCHOTIC FEATURES (H): Primary | ICD-10-CM

## 2025-04-08 DIAGNOSIS — R45.851 SUICIDAL THOUGHTS: ICD-10-CM

## 2025-04-08 DIAGNOSIS — F10.90 ALCOHOL USE: ICD-10-CM

## 2025-04-08 PROBLEM — F12.20 CANNABIS DEPENDENCE (H): Status: ACTIVE | Noted: 2025-04-08

## 2025-04-08 PROBLEM — F41.1 GAD (GENERALIZED ANXIETY DISORDER): Status: ACTIVE | Noted: 2025-04-08

## 2025-04-08 LAB
ALBUMIN SERPL BCG-MCNC: 5.2 G/DL (ref 3.5–5.2)
ALP SERPL-CCNC: 78 U/L (ref 40–150)
ALT SERPL W P-5'-P-CCNC: 21 U/L (ref 0–70)
AMPHETAMINES UR QL SCN: ABNORMAL
ANION GAP SERPL CALCULATED.3IONS-SCNC: 13 MMOL/L (ref 7–15)
AST SERPL W P-5'-P-CCNC: 36 U/L (ref 0–45)
BARBITURATES UR QL SCN: ABNORMAL
BENZODIAZ UR QL SCN: ABNORMAL
BILIRUB SERPL-MCNC: 0.4 MG/DL
BUN SERPL-MCNC: 10.3 MG/DL (ref 6–20)
BZE UR QL SCN: ABNORMAL
CALCIUM SERPL-MCNC: 9.4 MG/DL (ref 8.8–10.4)
CANNABINOIDS UR QL SCN: ABNORMAL
CHLORIDE SERPL-SCNC: 102 MMOL/L (ref 98–107)
CREAT SERPL-MCNC: 1.03 MG/DL (ref 0.67–1.17)
EGFRCR SERPLBLD CKD-EPI 2021: >90 ML/MIN/1.73M2
ERYTHROCYTE [DISTWIDTH] IN BLOOD BY AUTOMATED COUNT: 12.5 % (ref 10–15)
ETHANOL SERPL-MCNC: <0.01 G/DL
FENTANYL UR QL: ABNORMAL
GLUCOSE SERPL-MCNC: 97 MG/DL (ref 70–99)
HCO3 SERPL-SCNC: 25 MMOL/L (ref 22–29)
HCT VFR BLD AUTO: 44.5 % (ref 40–53)
HGB BLD-MCNC: 15.4 G/DL (ref 13.3–17.7)
HOLD SPECIMEN: NORMAL
MCH RBC QN AUTO: 30.3 PG (ref 26.5–33)
MCHC RBC AUTO-ENTMCNC: 34.6 G/DL (ref 31.5–36.5)
MCV RBC AUTO: 87 FL (ref 78–100)
OPIATES UR QL SCN: ABNORMAL
PCP QUAL URINE (ROCHE): ABNORMAL
PLATELET # BLD AUTO: 362 10E3/UL (ref 150–450)
POTASSIUM SERPL-SCNC: 3.6 MMOL/L (ref 3.4–5.3)
PROT SERPL-MCNC: 8.3 G/DL (ref 6.4–8.3)
RBC # BLD AUTO: 5.09 10E6/UL (ref 4.4–5.9)
SODIUM SERPL-SCNC: 140 MMOL/L (ref 135–145)
WBC # BLD AUTO: 15.6 10E3/UL (ref 4–11)

## 2025-04-08 PROCEDURE — 124N000001 HC R&B MH

## 2025-04-08 PROCEDURE — 250N000013 HC RX MED GY IP 250 OP 250 PS 637: Performed by: PSYCHIATRY & NEUROLOGY

## 2025-04-08 PROCEDURE — 0HQHXZZ REPAIR RIGHT UPPER LEG SKIN, EXTERNAL APPROACH: ICD-10-PCS | Performed by: NURSE PRACTITIONER

## 2025-04-08 PROCEDURE — 250N000013 HC RX MED GY IP 250 OP 250 PS 637

## 2025-04-08 PROCEDURE — 82077 ASSAY SPEC XCP UR&BREATH IA: CPT | Performed by: NURSE PRACTITIONER

## 2025-04-08 PROCEDURE — 12002 RPR S/N/AX/GEN/TRNK2.6-7.5CM: CPT | Performed by: NURSE PRACTITIONER

## 2025-04-08 PROCEDURE — 84155 ASSAY OF PROTEIN SERUM: CPT | Performed by: NURSE PRACTITIONER

## 2025-04-08 PROCEDURE — 85027 COMPLETE CBC AUTOMATED: CPT | Performed by: NURSE PRACTITIONER

## 2025-04-08 PROCEDURE — 250N000013 HC RX MED GY IP 250 OP 250 PS 637: Performed by: NURSE PRACTITIONER

## 2025-04-08 PROCEDURE — 99285 EMERGENCY DEPT VISIT HI MDM: CPT | Mod: 25 | Performed by: NURSE PRACTITIONER

## 2025-04-08 PROCEDURE — 80307 DRUG TEST PRSMV CHEM ANLYZR: CPT | Performed by: NURSE PRACTITIONER

## 2025-04-08 PROCEDURE — 36415 COLL VENOUS BLD VENIPUNCTURE: CPT | Performed by: NURSE PRACTITIONER

## 2025-04-08 PROCEDURE — 82435 ASSAY OF BLOOD CHLORIDE: CPT | Performed by: NURSE PRACTITIONER

## 2025-04-08 PROCEDURE — 0HQKXZZ REPAIR RIGHT LOWER LEG SKIN, EXTERNAL APPROACH: ICD-10-PCS | Performed by: NURSE PRACTITIONER

## 2025-04-08 RX ORDER — ACETAMINOPHEN 325 MG/1
650 TABLET ORAL EVERY 4 HOURS PRN
Status: DISCONTINUED | OUTPATIENT
Start: 2025-04-08 | End: 2025-04-10 | Stop reason: HOSPADM

## 2025-04-08 RX ORDER — NICOTINE 21 MG/24HR
1 PATCH, TRANSDERMAL 24 HOURS TRANSDERMAL DAILY
Status: DISCONTINUED | OUTPATIENT
Start: 2025-04-08 | End: 2025-04-10 | Stop reason: HOSPADM

## 2025-04-08 RX ORDER — MAGNESIUM HYDROXIDE/ALUMINUM HYDROXICE/SIMETHICONE 120; 1200; 1200 MG/30ML; MG/30ML; MG/30ML
30 SUSPENSION ORAL EVERY 4 HOURS PRN
Status: DISCONTINUED | OUTPATIENT
Start: 2025-04-08 | End: 2025-04-10 | Stop reason: HOSPADM

## 2025-04-08 RX ORDER — OLANZAPINE 10 MG/1
10 TABLET, FILM COATED ORAL 3 TIMES DAILY PRN
Status: DISCONTINUED | OUTPATIENT
Start: 2025-04-08 | End: 2025-04-10 | Stop reason: HOSPADM

## 2025-04-08 RX ORDER — LORAZEPAM 1 MG/1
1 TABLET ORAL ONCE
Status: COMPLETED | OUTPATIENT
Start: 2025-04-08 | End: 2025-04-08

## 2025-04-08 RX ORDER — LAMOTRIGINE 100 MG/1
200 TABLET ORAL EVERY MORNING
COMMUNITY
Start: 2025-03-10

## 2025-04-08 RX ORDER — ACETAMINOPHEN 325 MG/1
975 TABLET ORAL ONCE
Status: COMPLETED | OUTPATIENT
Start: 2025-04-08 | End: 2025-04-08

## 2025-04-08 RX ORDER — HYDROXYZINE HYDROCHLORIDE 25 MG/1
25 TABLET, FILM COATED ORAL EVERY 4 HOURS PRN
Status: DISCONTINUED | OUTPATIENT
Start: 2025-04-08 | End: 2025-04-10 | Stop reason: HOSPADM

## 2025-04-08 RX ORDER — POLYETHYLENE GLYCOL 3350 17 G/17G
17 POWDER, FOR SOLUTION ORAL DAILY PRN
Status: DISCONTINUED | OUTPATIENT
Start: 2025-04-08 | End: 2025-04-10 | Stop reason: HOSPADM

## 2025-04-08 RX ORDER — LORAZEPAM 1 MG/1
1 TABLET ORAL 2 TIMES DAILY PRN
Status: DISCONTINUED | OUTPATIENT
Start: 2025-04-08 | End: 2025-04-10 | Stop reason: HOSPADM

## 2025-04-08 RX ORDER — ZOLPIDEM TARTRATE 5 MG/1
5 TABLET ORAL
Status: DISCONTINUED | OUTPATIENT
Start: 2025-04-08 | End: 2025-04-10 | Stop reason: HOSPADM

## 2025-04-08 RX ORDER — IBUPROFEN 600 MG/1
600 TABLET, FILM COATED ORAL ONCE
Status: COMPLETED | OUTPATIENT
Start: 2025-04-08 | End: 2025-04-08

## 2025-04-08 RX ORDER — OLANZAPINE 10 MG/2ML
10 INJECTION, POWDER, FOR SOLUTION INTRAMUSCULAR 3 TIMES DAILY PRN
Status: DISCONTINUED | OUTPATIENT
Start: 2025-04-08 | End: 2025-04-10 | Stop reason: HOSPADM

## 2025-04-08 RX ADMIN — IBUPROFEN 600 MG: 600 TABLET, FILM COATED ORAL at 16:04

## 2025-04-08 RX ADMIN — ZOLPIDEM TARTRATE 5 MG: 5 TABLET ORAL at 20:09

## 2025-04-08 RX ADMIN — LORAZEPAM 1 MG: 1 TABLET ORAL at 21:49

## 2025-04-08 RX ADMIN — LORAZEPAM 1 MG: 1 TABLET ORAL at 16:04

## 2025-04-08 RX ADMIN — ACETAMINOPHEN 975 MG: 325 TABLET, FILM COATED ORAL at 16:04

## 2025-04-08 RX ADMIN — NICOTINE 1 PATCH: 21 PATCH, EXTENDED RELEASE TRANSDERMAL at 17:34

## 2025-04-08 ASSESSMENT — ACTIVITIES OF DAILY LIVING (ADL)
ADLS_ACUITY_SCORE: 15
ADLS_ACUITY_SCORE: 41
ADLS_ACUITY_SCORE: 41
ADLS_ACUITY_SCORE: 15
ADLS_ACUITY_SCORE: 41
ADLS_ACUITY_SCORE: 15
ADLS_ACUITY_SCORE: 41
ADLS_ACUITY_SCORE: 41
ADLS_ACUITY_SCORE: 15
ADLS_ACUITY_SCORE: 15

## 2025-04-08 ASSESSMENT — ENCOUNTER SYMPTOMS
NEUROLOGICAL NEGATIVE: 1
RESPIRATORY NEGATIVE: 1
CONSTITUTIONAL NEGATIVE: 1
MUSCULOSKELETAL NEGATIVE: 1
ENDOCRINE NEGATIVE: 1
HEMATOLOGIC/LYMPHATIC NEGATIVE: 1
CARDIOVASCULAR NEGATIVE: 1
GASTROINTESTINAL NEGATIVE: 1
ALLERGIC/IMMUNOLOGIC NEGATIVE: 1
WOUND: 1
NERVOUS/ANXIOUS: 1
EYES NEGATIVE: 1

## 2025-04-08 ASSESSMENT — COLUMBIA-SUICIDE SEVERITY RATING SCALE - C-SSRS
1. IN THE PAST MONTH, HAVE YOU WISHED YOU WERE DEAD OR WISHED YOU COULD GO TO SLEEP AND NOT WAKE UP?: YES
4. HAVE YOU HAD THESE THOUGHTS AND HAD SOME INTENTION OF ACTING ON THEM?: NO
5. HAVE YOU STARTED TO WORK OUT OR WORKED OUT THE DETAILS OF HOW TO KILL YOURSELF? DO YOU INTEND TO CARRY OUT THIS PLAN?: NO
3. HAVE YOU BEEN THINKING ABOUT HOW YOU MIGHT KILL YOURSELF?: YES
2. HAVE YOU ACTUALLY HAD ANY THOUGHTS OF KILLING YOURSELF IN THE PAST MONTH?: YES
6. HAVE YOU EVER DONE ANYTHING, STARTED TO DO ANYTHING, OR PREPARED TO DO ANYTHING TO END YOUR LIFE?: NO

## 2025-04-08 NOTE — PLAN OF CARE
Goal Outcome Evaluation:             ADMISSION NOTE    Report received from Guillermina in the ER.      Reason for admission suicide attempt.  Safety concerns risk for infection from SIB and self harm.  Risk for or history of violence none reported.   Full skin assessment: pt has multiple cuts on his arms and legs. Pt states he has about 30 cuts, his plan was to bleed out in the 9    Patient arrived on unit from [facility]. accompanied by staff and security on 4/8/2025  4:52 PM.   Status on arrival: pt cooperative and pleasant. Pt is sad but denies being suicidal at this time. Pt contracts for safety while on the unit and agrees to come talk to his nurse if he starts feeling suicidal again.   Pt reports pain at 6/10 and anxiety at 8/10. Pt wants to go to sleep as soon at he got on the floor.         BP (!) 158/90   Pulse 109   Temp 98  F (36.7  C) (Tympanic)   Resp 18   SpO2 97%   Patient given tour of unit and Welcome to  unit papers given to patient, wanding completed, belongings inventoried, and admission assessment completed.   Patient's legal status on arrival is voluntary. Appropriate legal rights discussed with and copy given to patient. Patient Bill of Rights discussed with and copy given to patient.   Patient denies SI, HI, and thoughts of self harm and contracts for safety while on unit.      Jeny Dubose, RN  4/8/2025  5:38 PM

## 2025-04-08 NOTE — ED NOTES
IP MH Referral Acuity Rating Score (RARS)    LMHP complete at referral to IP MH, with DEC; and, daily while awaiting IP MH placement. Call score to PPS.  CRITERIA SCORING   New 70 HH and Involuntary for IP MH (not adolescent) 0/3   Boarding over 24 hours 0/1   Vulnerable adult at least 55+ with multiple co morbidities; or, Patient age 11 or under 0/1   Suicide ideation without relief of precipitating factors 1/1   Current plan for suicide 1/1   Current plan for homicide 0/1   Imminent risk or actual attempt to seriously harm another without relief of factors precipitating the attempt 1/1   Severe dysfunction in daily living (ex: complete neglect for self care, extreme disruption in vegetative function, extreme deterioration in social interactions) 1/1   Recent (last 2 weeks) or current physical aggression in the ED 0/1   Restraints or seclusion episode in ED 0/1   Verbal aggression, agitation, yelling, etc., while in the ED 0/1   Active psychosis with psychomotor agitation or catatonia 0/1   Need for constant or near constant redirection (from leaving, from others, etc).  0/1   Intrusive or disruptive behaviors 0/1   TOTAL 4

## 2025-04-08 NOTE — PROGRESS NOTES
04/08/25 8020   Patient Belongings   Did you bring any home meds/supplements to the hospital?  No   Patient Belongings remains on inpatient care area;sent to security per site process   Patient Belongings Put in Hospital Secure Location (Security or Locker, etc.) clothing;other (see comments);shoes  (Red Basball Hat, Sin Jacket, Old Spice Deodorant, Nike Sneakers, Grey/Black Checkered Shirt, Brown Belt, Blue Vape, Loon Vape juice, Blue Jeans, Black Socks)   Belongings Search Yes   Clothing Search Yes   Second Staff Edwige     List items sent to safe: Black Iphone, Brown Wallet, Good Rx Card, [place] Card, Social Security Card, [place] DL, Cash Jackie Card  All other belongings put in assigned cubby in belongings room.       I have reviewed my belongings list on admission and verify that it is correct.     Patient signature_______________________________    Second staff witness (if patient unable to sign) ______________________________       I have received all my belongings at discharge.    Patient signature________________________________    Autumn 4/8/2025  5:51 PM

## 2025-04-08 NOTE — ED TRIAGE NOTES
"\"Having suicidal thoughts last night.  I would cut myself in a bathtub of water if I was to follow through with the plan.  I am not sure if I am suicidal now or not.  I wish I was never bored at all.  I wish I was not scared of myself.  It has been a rough and unfair year.  I got wrongfully evicted and locks were changed.  The current house I am in had dead animals in it and we had to do a lot of cleaning.  I have hx of depression.  I am also Bipolar.  My mother is schizophrenic and as I get older I am scared that I am getting more of her traits.\"         "

## 2025-04-08 NOTE — ED PROVIDER NOTES
History     Chief Complaint   Patient presents with    Psychiatric Evaluation    Suicidal     HPI  Raphael Aceves is a 24 year old individual comes in for suicidal thoughts.  Patient states that he has been having a rough time over the past year but states cannot keep his pain under control while he is away he is starting to have suicidal thoughts and was in a bathtub last night and started cutting his legs and arms with a switchblade.  Patient states that he may follow through with this so comes in requesting help for his suicidal thoughts.  No alcohol use or drug use.  States has not been admitted for psych before or when he was a kid.    Allergies:  No Known Allergies    Problem List:    Patient Active Problem List    Diagnosis Date Noted    Bipolar disorder, current episode depressed, severe, without psychotic features (H) 04/08/2025     Priority: Medium    CORAL (generalized anxiety disorder) 04/08/2025     Priority: Medium    Cannabis dependence (H) 04/08/2025     Priority: Medium    Suicidal thoughts 04/08/2025     Priority: Medium    Deliberate self-cutting 04/08/2025     Priority: Medium    Laceration of left lower extremity, initial encounter 04/08/2025     Priority: Medium    Laceration of right lower extremity, initial encounter 04/08/2025     Priority: Medium        Past Medical History:    No past medical history on file.    Past Surgical History:    No past surgical history on file.    Family History:    No family history on file.    Social History:  Marital Status:  Single [1]  Social History     Tobacco Use    Smoking status: Every Day     Current packs/day: 0.50     Types: Vaping Device, Cigarettes    Smokeless tobacco: Never   Substance Use Topics    Alcohol use: Not Currently    Drug use: Not Currently        Medications:    eszopiclone (LUNESTA) 2 MG tablet  lamoTRIgine (LAMICTAL) 100 MG tablet  LORazepam (ATIVAN) 1 MG tablet  LORazepam (ATIVAN) 2 MG tablet          Review of Systems    Constitutional: Negative.    HENT: Negative.     Eyes: Negative.    Respiratory: Negative.     Cardiovascular: Negative.    Gastrointestinal: Negative.    Endocrine: Negative.    Genitourinary: Negative.    Musculoskeletal: Negative.    Skin:  Positive for wound (Lacerations to bilateral legs and left arm).   Allergic/Immunologic: Negative.    Neurological: Negative.    Hematological: Negative.    Psychiatric/Behavioral:  Positive for suicidal ideas. The patient is nervous/anxious.        Physical Exam   BP: (!) 158/90  Pulse: 109  Temp: 98  F (36.7  C)  Resp: 18  SpO2: 97 %      GENERAL APPEARANCE:  The patient is a 24 year old well-developed, well-nourished individual that appears as stated age.  LUNGS:  Breathing is easy.  Breath sounds are equal and clear bilaterally.  No wheezes, rhonchi, or rales.  HEART:  Regular rate and rhythm with normal S1 and S2.  No murmurs, gallops, or rubs.  EXTREMITIES:  No cyanosis, clubbing, or edema.  Pedal and post-tibial pulses are 2+ bilaterally.  Radial pulses are 2+ bilaterally.  MUSCULOSKELETAL:  Normal gait and station.  Full range of motion all joints to upper and lower extremities.  No crepitus upon palpation.  NEUROLOGIC:  No focal sensory or motor deficits are noted.  PSYCHIATRIC:   Gait and Station: Normal  Psychomotor Behavior:  no evidence of tardive dyskinesia, dystonia, or tics  Attention Span and Concentration:  intact  Eye Contact:  good  Speech:  clear, coherent  Mood:  anxious and sad   Affect:  mood congruent and intensity is dramatic  Thought Process:  logical  Thought Content:  plan for suicide present  Oriented to:  time, person, and place  Recent and Remote Memory:  intact  Judgment:  intact  Attitude:  cooperative  Insight:   not assessed    SKIN:  Warm, dry, and well perfused.  Good turgor.  Multiple superficial lacerations to left arm, bilateral thighs, and bilateral calves.  A few cuts are down to the dermis.  No active bleeding or foreign body  present.  TDAP STATUS: Last Tdap given is unknown.                                   ED Course     ED Course as of 04/08/25 1628   Tue Apr 08, 2025   1337 Labs and DEC assessment ordered.   1337 In to see patient and history/physical completed.    1425 [place] psych refuses direct admission and requests DEC assessment.   1430 Patient apparently refused Tdap as he had this [place] another state.   1443 Lacerations right leg x3 repaired with Dermabond.   1554 DEC recommends inpatient.     [facility]    -Laceration Repair    Date/Time: 4/8/2025 2:41 PM    Performed by: Dennis Dillard APRN CNP  Authorized by: Dennis Dillard APRN CNP    Risks, benefits and alternatives discussed.      ANESTHESIA (see MAR for exact dosages):     Anesthesia method:  None  LACERATION DETAILS     Location:  Leg    Leg location: Right thigh x 2 and right calf x 1.    Length (cm):  4    Depth (mm):  1    REPAIR TYPE:     Repair type:  Simple    EXPLORATION:     Hemostasis achieved with:  Direct pressure    Wound exploration: entire depth of wound probed and visualized      Wound extent: fascia not violated, no foreign body, no signs of injury, no nerve damage, no tendon damage and no vascular damage      Contaminated: no      TREATMENT:     Area cleansed with:  Soap and water    Amount of cleaning:  Standard    Visualized foreign bodies/material removed: no      SKIN REPAIR     Repair method:  Tissue adhesive    APPROXIMATION     Approximation:  Close    POST-PROCEDURE DETAILS     Dressing:  Open (no dressing)      PROCEDURE    Patient Tolerance:  Patient tolerated the procedure well with no immediate complications                Results for orders placed or performed during the hospital encounter of 04/08/25 (from the past 24 hours)   CBC with platelets   Result Value Ref [facility]    WBC Count 15.6 (H) 4.0 - 11.0 10e3/uL    RBC Count 5.09 4.40 - 5.90 10e6/uL    Hemoglobin 15.4 13.3 - 17.7 g/dL    Hematocrit 44.5 40.0 -  53.0 %    MCV 87 78 - 100 fL    MCH 30.3 26.5 - 33.0 pg    MCHC 34.6 31.5 - 36.5 g/dL    RDW 12.5 10.0 - 15.0 %    Platelet Count 362 150 - 450 10e3/uL   Comprehensive metabolic panel   Result Value Ref [facility]    Sodium 140 135 - 145 mmol/L    Potassium 3.6 3.4 - 5.3 mmol/L    Carbon Dioxide (CO2) 25 22 - 29 mmol/L    Anion Gap 13 7 - 15 mmol/L    Urea Nitrogen 10.3 6.0 - 20.0 mg/dL    Creatinine 1.03 0.67 - 1.17 mg/dL    GFR Estimate >90 >60 mL/min/1.73m2    Calcium 9.4 8.8 - 10.4 mg/dL    Chloride 102 98 - 107 mmol/L    Glucose 97 70 - 99 mg/dL    Alkaline Phosphatase 78 40 - 150 U/L    AST 36 0 - 45 U/L    ALT 21 0 - 70 U/L    Protein Total 8.3 6.4 - 8.3 g/dL    Albumin 5.2 3.5 - 5.2 g/dL    Bilirubin Total 0.4 <=1.2 mg/dL   Ethyl Alcohol Level   Result Value Ref [facility]    Alcohol ethyl <0.01 <=0.01 g/dL   Extra Tube    Narrative    The following orders were created for panel order Extra Tube.  Procedure                               Abnormality         Status                     ---------                               -----------         ------                     Extra Blue Top Tube[[id number]]                             Final result               Extra Red Top Tube[[id number]]                              Final result               Extra Heparinized Syringe[[id number]]                       Final result                 Please view results for these tests on the individual orders.   Extra Blue Top Tube   Result Value Ref [facility]    Hold Specimen JIC    Extra Red Top Tube   Result Value Ref [facility]    Hold Specimen JIC    Extra Heparinized Syringe   Result Value Ref [facility]    Hold Specimen JIC    Urine Drug Screen    Narrative    The following orders were created for panel order Urine Drug Screen.  Procedure                               Abnormality         Status                     ---------                               -----------         ------                     Urine Drug Screen Panel[[id number]]     Abnormal             Final result                 Please view results for these tests on the individual orders.   Urine Drug Screen Panel   Result Value Ref [facility]    Amphetamines Urine Screen Negative Screen Negative    Barbituates Urine Screen Negative Screen Negative    Benzodiazepine Urine Screen Positive (A) Screen Negative    Cannabinoids Urine Screen Positive (A) Screen Negative    Cocaine Urine Screen Negative Screen Negative    Fentanyl Qual Urine Screen Negative Screen Negative    Opiates Urine Screen Negative Screen Negative    PCP Urine Screen Negative Screen Negative       Medications   Tdap (tetanus-diphtheria-acell pertussis) (ADACEL) injection 0.5 mL (0.5 mLs Intramuscular Not Given 4/8/25 1603)   LORazepam (ATIVAN) tablet 1 mg (1 mg Oral $Given 4/8/25 1604)   ibuprofen (ADVIL/MOTRIN) tablet 600 mg (600 mg Oral $Given 4/8/25 1604)   acetaminophen (TYLENOL) tablet 975 mg (975 mg Oral $Given 4/8/25 1604)       Assessments & Plan (with Medical Decision Making)     I have reviewed the nursing notes.    I have reviewed the findings, diagnosis, plan and need for follow up with the patient.    Summary:  Patient presents to the ER today for suicidal ideation.  Potential diagnosis which have been considered and evaluated include suicidal ideation, metabolic encephalopathy, alcohol intoxication, drug intoxication, as well as others. Many of these have been excluded using the various modalities and assessment as noted on the chart. At the present time, the diagnosis given seems to be the most likely suicidal ideation, multiple lacerations to the extremities without infection.  Upon arrival, vitals signs show blood pressure 158/90 with a pulse of 109.  Temperature 98  F.  Respirations 18 with oxygen 97% on room air.  The patient is very tearful and depressed and crying on arrival.  Patient has suicidal plan of cutting himself.  Patient is cooperative and wanting help.  Multiple superficial lacerations to left arm, bilateral  thighs and calves areas.  Does have 1 laceration to right thigh and left thigh that are open and repaired using Dermabond.  No signs of infection.  CMS intact.  Basic lab work obtained showing WBC of 15.6 with hemoglobin of 15.4.  Electrolytes, renal, hepatic functions normal.  Alcohol negative.  Drug screen positive for benzos and cannabinoids.  Lacerations cleaned with soap and water.  Laceration to right leg x 3 closed with Dermabond.  Patient refused Tdap as apparently he had this 4 [place] ago [place].  Case sent for [facility] for review for admission but DEC assessment requested.  For this reason DEC assessment completed and recommends inpatient status.  Patient was given lorazepam 1 mg for anxiety and significant/ibuprofen for leg pain.  Patient accepted for [facility] admission.      Critical Care Time: None    Impression and plan discussed with patient. Questions answered, concerns addressed, indications for urgent re-evaluation reviewed, and  given. Patient/Parent/Caregiver agree with treatment plan and have no further questions at this time.      This document was prepared using a combination of typing and voice generated software.  While every attempt was made for accuracy, spelling and grammatical errors may exist.              New Prescriptions    No medications on file       Final diagnoses:   Suicidal thoughts   Deliberate self-cutting   Laceration of right lower extremity, initial encounter   Laceration of left lower extremity, initial encounter       4/8/2025   [facility]       Dennis Dillard APRN CNP  04/08/25 [id number]

## 2025-04-08 NOTE — ED NOTES
Wounds cleansed with mild soap and water. Patient states had T-dap 4 years ago in [place]. Declining injection at this time. Education given.

## 2025-04-08 NOTE — ED NOTES
Patient changed into scrubs, personal belongings removed and placed in belongings bags, security at bedside for wanding, lab at bedside. Patient calm and cooperative.

## 2025-04-08 NOTE — PLAN OF CARE
Raphael REEDER Yola  April 8, 2025  Plan of Care Hand-off Note     Patient Recommended Care Path: inpatient mental health    Clinical Substantiation:  Pt being present in the ER today due to worsening of depression, anxiety, and suicidal ideations.  Pt currently denied having suicidal ideations but reported having thoughts of suicide last night with plan to cutting himself in the bathtub. Pt reported engaging in SIB by cutting himself with a knife in the bathtub last night as he sustained multiple lacerations on his arms and legs. Pt reported he did not make suicide attempt last night but at the same time he did not care if he would bleed to death. Pt denied previous suicide attempt. Pt denied having homicidal ideations, and access to firearms. Pt identified recently losing his job, financial stress, having arguments with his girlfriend last night and brother in-law having legal issues as triggers leading to his current mental health crisis. Pt endorsed increased depression, worry, racing thoughts and anxiety. Pt shared he mostly worried about finding a good job and financial struggle. Pt reported having poor sleep but normal appetite. Pt endorsed paranoia as he felt someone was watching him but denied having hallucinations. Pt also denied having acute boaz.  Pt was not able to engage in his DEC Safety plan.  Pt has limited coping skills, impaired judgment and low insight.  Writer recommended inpatient psychiatric service for further stabilization, safety assessment and medication management.    Goals for crisis stabilization:  Pt being referred to inpatient psychiatric service for further stabilization, safety assessment and medication management.    Next steps for Care Team:  Pt being transferred to inpatient psychiatric unit at [facility].    Treatment Objectives Addressed:  rapport building, safety planning, assessing safety, identifying an appropriate aftercare plan, identifying and practicing  coping strategies, processing feelings, identifying additional supports    Therapeutic Interventions:  Engaged in safety planning, Engaged in guided discovery, explored patient's perspectives and helped expand them through socratic dialogue., Coached on coping techniques/relaxation skills to help improve distress tolerance and managing intense emotions.    Has a specific means been identified for suicidal.homicide actions: Yes  If yes, describe: Pt reported having suicidal plan last night by cutting himself in the bathtub.  Explain action steps toward mitigation: Writer engaged Pt in his suicide risk assessment, reviewed safety plan, coping skills and support system.  Document completion of mitigation action: Pt being referred to inpatient psychiatric service for further stabilization, safety assessment and medication management.  The follow up action still needed prior to discharge: Pt will need to establish new outpatient individual therapy service and close follow up with his current medication manager.    Patient coping skills attempted to reduce the crisis:   coloring, playing video games, skateboarding, kayaking, playing basketball, writing, watching TV, listening to music, deep breathing exercise        Imminent risk of harm: Suicidal Behavior  Severe psychiatric, behavioral or other comorbid conditions are appropriate for management at inpatient mental health as indicated by at least one of the following: Psychiatric Symptoms, Comorbid substance use disorder, Impaired impulse control, judgement, or insight, Symptoms of impact to function  Severe dysfunction in daily living is present as indicated by at least one of the following: Other evidence of severe dysfunction, Complete inability to maintain any appropriate aspect of personal responsibility in any adult roles  Situation and expectations are appropriate for inpatient care: Voluntary treatment at lower level of care is not feasible, Patient  management/treatment at lower level of care is not feasible or is inappropriate, Biopsychosocial stresses potentially contributing to clinical presentation (co morbidities) have been assessed and are absent or manageable at proposed level of care  Inpatient mental health services are necessary to meet patient needs and at least one of the following: Specific condition related to admission diagnosis is present and judged likely to further improve at proposed level of care, Specific condition related to admission diagnosis is present and judged likely to deteriorate in absence of treatment at proposed level of care      Collateral contact information:  Shelly Horn [phone]    Legal Status: Voluntary/Patient has signed consent for treatment                                                                                                                                       Psychiatry Consult:     PEDRO Geiger

## 2025-04-08 NOTE — ED NOTES
"Patient stated \"I was suicidal last night and yesterday, I don't think right now I am, I just feel like I was never born. I feel like I am just burdening my family, my mother has schizophrenia and I am afraid I am becoming more like her as I am getting older.\" Writer asked when and how did he get the lacerations? Patient stated \"I was in the bath last night was pretty upset and cut myself with my switchblade, I have cut myself in the past but never this bad, I was trying to hurt myself thinking I would just bleed out.\" Patient also stated \"I see my primary anywhere from once a month to once every 6 months. I take my meds regularly but have not taken them today, will they give them to me up there? I do want to be admitted if I can, I need help.\" Patient crying/tearful, s/o at bedside. Denies pain at this time.   "

## 2025-04-08 NOTE — TELEPHONE ENCOUNTER
R:    4:26 PM ED called intake to move pt from ED to [facility].     4:32 PM Per Pt station and Unit manager, Pt is already admitted at [facility]/[place].     4:33 PM Called [place] ED, per call with Nurse; Intake informed them that the pt is already admitted to the unit.     5:19 PM Indicia Completed. Pt added to Admit Board.    5:23 PM Financial Clearance and Beh team notified.     5:24 PM Chart Updated.
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
## 2025-04-09 PROCEDURE — 99223 1ST HOSP IP/OBS HIGH 75: CPT | Mod: AI | Performed by: NURSE PRACTITIONER

## 2025-04-09 PROCEDURE — 250N000013 HC RX MED GY IP 250 OP 250 PS 637: Performed by: PSYCHIATRY & NEUROLOGY

## 2025-04-09 PROCEDURE — 99221 1ST HOSP IP/OBS SF/LOW 40: CPT | Performed by: NURSE PRACTITIONER

## 2025-04-09 PROCEDURE — 250N000013 HC RX MED GY IP 250 OP 250 PS 637: Performed by: NURSE PRACTITIONER

## 2025-04-09 PROCEDURE — 250N000013 HC RX MED GY IP 250 OP 250 PS 637

## 2025-04-09 PROCEDURE — 124N000001 HC R&B MH

## 2025-04-09 RX ORDER — LAMOTRIGINE 100 MG/1
200 TABLET ORAL EVERY MORNING
Status: DISCONTINUED | OUTPATIENT
Start: 2025-04-09 | End: 2025-04-10 | Stop reason: HOSPADM

## 2025-04-09 RX ORDER — PHENOL 1.4 %
20 AEROSOL, SPRAY (ML) MUCOUS MEMBRANE AT BEDTIME
COMMUNITY

## 2025-04-09 RX ADMIN — NICOTINE 1 PATCH: 21 PATCH, EXTENDED RELEASE TRANSDERMAL at 08:46

## 2025-04-09 RX ADMIN — OLANZAPINE 10 MG: 10 TABLET, FILM COATED ORAL at 08:36

## 2025-04-09 RX ADMIN — LORAZEPAM 1 MG: 1 TABLET ORAL at 08:33

## 2025-04-09 RX ADMIN — LAMOTRIGINE 200 MG: 100 TABLET ORAL at 10:23

## 2025-04-09 RX ADMIN — LORAZEPAM 1 MG: 1 TABLET ORAL at 17:25

## 2025-04-09 RX ADMIN — OLANZAPINE 10 MG: 10 TABLET, FILM COATED ORAL at 19:15

## 2025-04-09 RX ADMIN — Medication 3 MG: at 00:02

## 2025-04-09 RX ADMIN — HYDROXYZINE HYDROCHLORIDE 25 MG: 25 TABLET, FILM COATED ORAL at 00:01

## 2025-04-09 ASSESSMENT — ACTIVITIES OF DAILY LIVING (ADL)
ADLS_ACUITY_SCORE: 15
ORAL_HYGIENE: INDEPENDENT
ADLS_ACUITY_SCORE: 15
HYGIENE/GROOMING: INDEPENDENT
ADLS_ACUITY_SCORE: 15
ADLS_ACUITY_SCORE: 15
DRESS: INDEPENDENT
ADLS_ACUITY_SCORE: 15
HYGIENE/GROOMING: INDEPENDENT
ADLS_ACUITY_SCORE: 15
ADLS_ACUITY_SCORE: 15
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
## 2025-04-09 NOTE — DISCHARGE INSTRUCTIONS
Behavioral Discharge Planning and Instructions    Summary: This is a 24 year old male with a PMH of bipolar disorder, anxiety, and depression who presents to the ED for evaluation of SI and SIB     Main Diagnosis:     #. Bipolar disorder, current episode depressed  #. Suicidal ideation    Health Care Follow-up:     [facility]  [address] [place] [address]  [phone]  Medication Management: April Dior- April 14th @ 3:30pm  Provider will place referral for therapy.     Chemical Dependency Treatment Programs  [facility]  [address] [place] [address]  [phone]    [facility]  [address]  [place] [address]  [phone]    [facility]  [address]  [place] [address]  [phone]    [facility]  [address] [place] [address] [phone]    [facility]  [address] [place] [phone] [phone]    Alcoholics Anonymous Meetings (from [place].org)    8:00 PM Sunday Balkan Sunday Primary Purpose Group   [facility]   [address]  [place]    8:00 PM Thursday Thursday Night Group   [place] [facility]   [address]  [place]    10:00 AM Friday Friday Morning A.A. Group   [place] [facility]   [address]  [place]    8:00 PM Friday Friday Night Group   [place] [facility]   [address]  [place]    10:00 AM Saturday  [place] Saturday Morning Group   [place] [facility]   [address]  [place]    8:00 PM Saturday Saturday Nite Keep It Simple Group   [place] [facility]   [address]  [place]    10:00 AM Rai  Sun Morning Daily Reflection Group   [place] [facility]   [address]  [place]    8:00 PM Sunday Sunday 12 & 12 Step Group   [place] [facility]   [address]  [place]    10:00 AM Monday Monday Morning A.A. Group   [place] [facility]   [address]  [place]    6:00 PM Monday  As Bill Sees It Group   [place] [facility]   [address]  [place]    7:00 PM Monday  [place] [facility]  [facility]   [address]  [place]    8:00 PM Monday   [place] Group   [place] [facility]   [address]  [place]    10:00 AM Tuesday Tuesday Morning A.A.   Group [place] [facility]   [address]  [place]    4:00 PM Tuesday Tuesday 4 PM Group   [place] [facility]   [address]  [place]    8:00 PM Tuesday Tuesday Night Group   [place] [facility]   [address]  [place]    10:00 AM Wednesday Wednesday Morning A.A. Group   [place] [facility]   [address]  [place]    6:00 PM Wednesday  Happy Hour Group   [place] [facility]   [address]  [place]    6:00 PM Wednesday  Happy Hour Group   [place] [facility]   [address]  [place]    8:00 PM Wednesday Wednesday Night Group   [place] [facility]   [address]  [place]    10:00 AM Thursday Thursday Morning Downtown Group  [facility]   [address]  [place]    12:00 PM Thursday Thursday Noon Group   [place] [facility]   [address]  [place]    Psychiatry & Therapy Resources   (offers both psychiatry/medication management & therapy)     [facility] - [phone]   [address] [place] [address]     [facility] - [phone]  [address] [place] [address]     [facility] - [phone]   [address],    [place] [address]     [address],    [place] [address]     [address] [facility] [address],    [place] [address]     [facility]   [address], [place] [address], [place] [address]     [facility] - [phone]   [address] [place] [address]     [place] Office - [phone]   [address] [facility] [address]   [place] [address]     [facility] - [phone]    [address] [place] [address]      [facility] Howard [facility] [phone]   [address] [place] [address]      [place]  JAYSHREE Angela [facility] - [phone]   [address] [address]     [employer] - [phone]   [address] [place] [address]    [address]    [place] [address]     [facility] - [phone]    [address] [place] [address]       [facility] - [phone]  [address] [place] [address]      Therapy Resources     [facility] - [phone], [phone], [phone], or [phone]   ([facility])   [address] [facility] [address]   [place] [address]     [facility] - [phone]    [address] [facility]    [address]    [place] [address]      [address] [place] [address]     [facility] - [phone]   [address] [place] [address]     [facility] - [phone]   [address] [place] [address] [address] (Located in the [facility] [place]   [place] [address]     [facility] - [phone]    [address] [place] [address]     [facility] - [phone]    [address] [place] [address]     [employer] - [phone]    [address] [place] [address]     [facility] - [phone]    [address] [place], [place], [address]     [facility] - [phone]    [address] [place] [address] [place] [address], [address]    [place] [address]       [address] [facility], [address]    [place] [address]     [facility]osal[facility] Darling, MS, LP - [phone]    [address],     [place] [address]     [facility] - [phone]    [address] [place] [address] [address],    [place] [address]     [facility] - [phone]    [address] [place] [address]          Attend all scheduled appointments with your outpatient providers. Call at least 24 hours in advance if you need to reschedule an appointment to ensure continued access to your outpatient providers.     Major Treatments, Procedures and Findings:  You were provided  "with: a psychiatric assessment, assessed for medical stability, medication evaluation and/or management, group therapy, family therapy, individual therapy, CD evaluation/assessment, milieu management, and medical interventions    Symptoms to Report: feeling more aggressive, increased confusion, losing more sleep, mood getting worse, or thoughts of suicide    Early warning signs can include: increased depression or anxiety sleep disturbances increased thoughts or behaviors of suicide or self-harm  increased unusual thinking, such as paranoia or hearing voices    Safety and Wellness:  Take all medicines as directed.  Make no changes unless your doctor suggests them.      Follow treatment recommendations.  Refrain from alcohol and non-prescribed drugs.  Ask your support system to help you reduce your access to items that could harm yourself or others. Items could include:  Firearms  Medicines (both prescribed and over-the-counter)  Knives and other sharp objects  Ropes and like materials  Car keys  If there is a concern for safety, call 911. If there is a concern for safety, call 911.    Resources:   Crisis Intervention: 840.113.9384 or 261-199-6518 (TTY: 529.983.6126).  Call anytime for help.  National [place] on Mental Illness (www.[place].haritha.org): 614.281.6032 or 251-793-9118.  [place] Association for Children's Mental Health (www.macmh.org): 892.337.7803.  Alcoholics Anonymous (www.alcoholics-anonymous.org): Check your phone book for your local chapter.  Suicide Awareness Voices of Education (SAVE) (www.save.org): 026-627-XZYH (6193)  National Suicide Prevention Line (www.mentalhealthmn.org): 186-107-ZGWF (1810)  Mental Health Consumer/Survivor Network of [place] (www.mhcsn.net): 586.358.6111 or 937-210-3590  Mental Health Association of [place] (www.mentalhealth.org): 531.116.1710 or 633-138-9694  Self- Management and Recovery Training., SMART-- Toll free: 543.803.2847  www.[employer]  Text 4 Life: txt \"LIFE\" to 29828 for " "immediate support and crisis intervention  Crisis text line: Text \"[place]\" to 925189. Free, confidential, 24/7.  Crisis Intervention: 330.377.3455 or 049-526-7924. Call anytime for help.     General Medication Instructions:   See your medication sheet(s) for instructions.   Take all medicines as directed.  Make no changes unless your doctor suggests them.   Go to all your doctor visits.  Be sure to have all your required lab tests. This way, your medicines can be refilled on time.  Do not use any drugs not prescribed by your doctor.  Avoid alcohol.    Advance Directives:   Scanned document on file with [employer]? No scanned doc  Is document scanned? Pt states no documents  Honoring Choices Your Rights Handout: Informed and given  Was more information offered? Pt declined    The Treatment team has appreciated the opportunity to work with you. If you have any questions or concerns about your recent admission, you can contact the unit which can receive your call 24 hours a day, 7 days a week. They will be able to get in touch with a Provider if needed. The unit number is [phone] .  "

## 2025-04-09 NOTE — H&P
[facility]    History and Physical  Medical Services       Date of Admission:  4/8/2025  Date of Service (when I saw the patient): 04/09/25    Assessment & Plan     Principal Problem:    Suicidal ideation    Active Medical Problems:    Bipolar disorder, current episode depressed, severe, without psychotic features (H)    CORAL (generalized anxiety disorder)    Cannabis dependence (H)    Suicidal thoughts    Lacerations- multiple lacerations noted to left upper extremity and bilateral lower extremities. Reportedly self inflicted with switchblade. None requiring sutures. 3 closed with dermabond. No excessive erythema, edema, drainage or s/s of infection. Pt denies any pain. Pt encouraged to report drainage, pain, erythema to nursing. Pt verbalized understanding. Nursing to continue to monitor and consult for new or worsening symptoms.     Pt medically stable, no acute medical concerns. Chronic medical problems stable. Will sign off. Please consult for any new medical issues or concerns.        Code Status: Full Code    Patricia Bojorquez CNP    Primary Care Physician   Josseline Dior    Chief Complaint   Psych evaluation     History is obtained from the patient and electronic health record    History of Present Illness   (Per ED) Raphael Aceves is a 24 year old individual comes in for suicidal thoughts.Patient states that he has been having a rough time over the past year but states cannot keep his pain under control while he is away he is starting to have suicidal thoughts and was in a bathtub last night and started cutting his legs and arms with a switchblade.  Patient states that he may follow through with this so comes in requesting help for his suicidal thoughts. No alcohol use or drug use.  States has not been admitted for psych before or when he was a kid.    Past Medical History    I have reviewed this patient's medical history and updated it with pertinent information if needed.   No past  medical history on file.    Past Surgical History   I have reviewed this patient's surgical history and updated it with pertinent information if needed.  No past surgical history on file.    Prior to Admission Medications   Prior to Admission Medications   Prescriptions Last Dose Informant Patient Reported? Taking?   LORazepam (ATIVAN) 2 MG tablet 4/7/2025 Bedtime  Yes Yes   Sig: Take 1 mg by mouth daily as needed for anxiety.   Patient taking differently: Take 1 mg by mouth daily. And an additional dose if needed.   Melatonin 10 MG TABS tablet 4/7/2025 Bedtime  Yes Yes   Sig: Take 20 mg by mouth at bedtime.   eszopiclone (LUNESTA) 1 MG tablet 4/7/2025 Bedtime  Yes Yes   Sig: Take 1 mg by mouth nightly as needed (insomnia).   lamoTRIgine (LAMICTAL) 100 MG tablet 4/7/2025 Morning  Yes Yes   Sig: Take 200 mg by mouth every morning.      Facility-Administered Medications: None     Allergies   No Known Allergies    Social History   I have reviewed this patient's social history and updated it with pertinent information if needed. Raphael Aceves  reports that he has been smoking vaping device. He has never used smokeless tobacco. He reports that he does not currently use alcohol. He reports that he does not currently use drugs.    Family History   I have reviewed this patient's family history and updated it with pertinent information if needed.   No family history on file.    Review of Systems   CONSTITUTIONAL:  negative  EYES:  negative  HEENT:  negative  RESPIRATORY:  negative  CARDIOVASCULAR:  negative  GASTROINTESTINAL:  negative  GENITOURINARY:  negative  INTEGUMENT/BREAST:  negative  HEMATOLOGIC/LYMPHATIC:  negative  ALLERGIC/IMMUNOLOGIC:  negative  ENDOCRINE:  negative  MUSCULOSKELETAL:  negative  NEUROLOGICAL:  negative    Physical Exam   Temp: 97  F (36.1  C) Temp src: Temporal BP: 98/50 Pulse: 83   Resp: 14 SpO2: 97 % O2 Device: None (Room air)    Vital Signs with Ranges  Temp:  [96.9  F (36.1  C)-98  F (36.7   C)] 97  F (36.1  C)  Pulse:  [] 83  Resp:  [14-18] 14  BP: ()/(50-90) 98/50  SpO2:  [97 %-99 %] 97 %  0 lbs 0 oz    Constitutional: awake, alert, cooperative, no apparent distress, and appears stated age, vitals stable   Eyes: Lids and lashes normal, pupils equal, round and reactive to light, extra ocular muscles intact, sclera clear, conjunctiva normal  ENT: Normocephalic, without obvious abnormality, atraumatic, external ears without lesions, oral pharynx with moist mucous membranes, no erythema or exudates, gums normal and good dentition.  Hematologic / Lymphatic: no cervical lymphadenopathy  Respiratory: No increased work of breathing, good air exchange, clear to auscultation bilaterally, no crackles or wheezing  Cardiovascular: Normal apical impulse, regular rate and rhythm, normal S1 and S2, no S3 or S4, and no murmur noted  GI: normal bowel sounds, soft, non-distended, non-tender, no masses palpated, no hepatosplenomegally  Genitounirinary: deferred  Skin: several lacerations to the upper left extremity and bilateral lower extremities, no excessive erythema, drainage, edema, no s/s of infection otherwise no bruising or bleeding and normal skin color, texture, turgor  Musculoskeletal: There is no redness, warmth, or swelling of the joints.  Full range of motion noted.    Neurologic: Awake, alert, oriented to name, place and time.  Cranial nerves II-XII are grossly intact.    Neuropsychiatric: General: restricted, calm, and fair eye contact    Data   Data reviewed today:   Recent Labs   Lab 04/08/25  1401   WBC 15.6*   HGB 15.4   MCV 87         POTASSIUM 3.6   CHLORIDE 102   CO2 25   BUN 10.3   CR 1.03   ANIONGAP 13   KARTIK 9.4   GLC 97   ALBUMIN 5.2   PROTTOTAL 8.3   BILITOTAL 0.4   ALKPHOS 78   ALT 21   AST 36       No results found for this or any previous visit (from the past 24 hours).
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
## 2025-04-09 NOTE — PLAN OF CARE
Face to face shift report received from Rosalie BURGESS RN. Rounding completed, pt observed.    Problem: Adult Behavioral Health Plan of Care  Goal: Patient-Specific Goal (Individualization)  Description: Patient will sleep 6 to 8 hours per night  Patient will eat at least 50% of meals  Patient will attend at least 50% of groups  Patient will comply with recommendations of treatment team  Patient will remain medication compliant      Outcome: Progressing  Note: Shift Summary:  Patient was resting in bed at the start of this shift.  Patient awakened for breakfast and he immediately requested Ativan for increased anxiety.  Nurse administered Ativan 1 mg po at 0833.  Patient immediately asking when he could take another dose.  Nurse asked patient  if he ever took more that he is supposed to and patient denied. Nurse reassured patient that she was only asking so we would know if to expect him to have withdrawal.  Patient continued to deny abuse of benzos.  Nurse did offer and patient did accept Zyprexa 10 mg po at 0836.  He did state that he did feel less distraught.  Ate lunch tray in his room and has been isolative to room.  Superficial cuts to left forearm and bilateral thighs.  No drainage.  No swelling or erythema.  Patient instructed to inform nursing staff of any bleeding or s/s of infection and he did state an understanding.     Problem: Suicide Risk  Goal: Absence of Self-Harm  Description: Patient will deny SI by discharge  Patient will remain free from self harm/injury during hospitalization  Outcome: Progressing     Problem: Infection  Goal: Absence of Infection Signs and Symptoms  Description: Patient's self inflicted wounds will heal without complication  Outcome: Progressing  Face to face report will be communicated to oncoming RN.    Daisy Moreno RN  4/9/2025

## 2025-04-09 NOTE — PLAN OF CARE
"  Problem: Adult Behavioral Health Plan of Care  Goal: Patient-Specific Goal (Individualization)  Description: Patient will sleep 6 to 8 hours per night  Patient will eat at least 50% of meals  Patient will attend at least 50% of groups  Patient will comply with recommendations of treatment team  Patient will remain medication compliant      4/9/2025 0152 by Rosalie Griffiths RN  Outcome: Progressing  Note: Report received from Jodie. Rounding complete. Pt observed pacing the unit briefly before coming to the nurse's station requesting a PRN for sleep and appears anxious. Pt again inappropriately focused on ativan (asked for it multiple times on PM shift per report as well) although told it is only BID and he has already had it twice. Pt was asked how frequently he takes it at home and he paused and had a somewhat guarded look on his face and stated only twice, but stated that he takes 2 mg in the morning and 1 mg later in the day or evening. Pt stated he plans to ask provider to increase the frequency of this med in the morning to possibly have a third dose added as a PRN. 0001- Pt was offered and accepted 3 mg melatonin and 25 mg atarax for sleep and anxiety. Pt was asked about his lacerations and he stated that they are fine and not bothersome. The ones on his palm and wrist that writer was able to see do not appear infected and appear superficial. There was no swelling, drainage, or notable redness at this time. Pt advised to inform staff of any changes with redness, swelling, heat, or drainage. Pt agreeable to this. Per pics in chart in ED providers notes the pt has several much deeper lacs to his bilateral upper and lower extremities with a few being noted as \"going down into the derms.\"    0045- Pt observed sleeping in supine position with regular and unlabored respirations.    Pt has been in bed with eyes closed and regular respirations. 15 minute and PRN checks all night. No complaints offered.     Pt " slept approx  6  hours this NOC shift.    Face to face end of shift report communicated to oncoming RN.    Rosalie HAYWARD RN  April 9, 2025  1:52 AM       Problem: Suicide Risk  Goal: Absence of Self-Harm  Description: Patient will deny SI by discharge  Patient will remain free from self harm/injury during hospitalization  4/9/2025 0152 by Rosalie Griffiths, RN  Outcome: Progressing  Note: Pt did not report SI and has remained free of injury and self harm.  4/9/2025 0152 by Rosalie Griffiths, RN  Outcome: Progressing

## 2025-04-09 NOTE — PLAN OF CARE
"  Problem: Adult Inpatient Plan of Care  Goal: Patient-Specific Goal (Individualized)  Description: Patient will attend 50% of more of groups  Patient will eat greater than 50% of meals  Patient will sleep 6-8 hours per night  Outcome: Not Progressing  Note: 1710: Patient laying in bed, appears calm and relaxed.  1715: Patient requesting Ativan \"I could really use that, I want to sleep the rest of this day away and start new tomorrow\"  Offered patient hydroxyzine or Zyprexa, patient declines \"I don't like the way hydroxyzine makes me feel\" \"I really just want the Ativan if you could make that happen.  Contacted on call provider as patient's last Ativan dose was less than two hours ago, provider prefers to wait on another dose for a longer period of time.  Per patient request also requested to give patient's sleeping medication early.    Allowed patient to rest, in bed sleeping soundly at 1745.     PRN:  Ambien 5 mg @ 2009, patient asks \"well can I have my Ativan now too?\"  Educated patient to allow the sleeping medication to work, \"well I really would like the Ativan too\"  Patient persistent on getting his Ativan.      Patient has snack in the lounge with peers.  Signs TERRELL at this time.     Patient continues to request Ativan,   PRN: Ativan 1 mg given @ 2149    Patient's wounds are intact, no redness or drainage noted.        Goal Outcome Evaluation:       Face to face end of shift report communicated to oncoming shift.     Jodie Gilmore RN  4/8/2025  11:24 PM                      "

## 2025-04-09 NOTE — MEDICATION SCRIBE - ADMISSION MEDICATION HISTORY
Medication Scribe Admission Medication History    Admission medication history is complete. The information provided in this note is only as accurate as the sources available at the time of the update.    Information Source(s): Patient, [facility], and [facility]  via in-person    [facility]- April Ovi  Last seen: 2/27/25  Active meds:  Ativan 2 mg- take 1/2 tab (1 mg) daily PRN  Lunesta 1 mg at bedtime PRN  Lamotrigine 100 mg take 2 tablets daily    Pertinent Information:   Patient reports he last took his home medications the day prior to admission.     Changes made to PTA medication list:  Added: melatonin  Deleted: None  Changed:   Ativan updated to most current sig per prescribing provider and pharmacy- pt reports he takes 1 mg daily scheduled and an additional dose as needed.     Allergies reviewed with patient and updates made in EHR: yes    Medication History Completed By: Kathryn De La Torre 4/9/2025 9:30 AM    PTA Med List   Medication Sig Last Dose/Taking    eszopiclone (LUNESTA) 1 MG tablet Take 1 mg by mouth nightly as needed (insomnia). 4/7/2025 Bedtime    lamoTRIgine (LAMICTAL) 100 MG tablet Take 200 mg by mouth every morning. 4/7/2025 Morning    LORazepam (ATIVAN) 2 MG tablet Take 1 mg by mouth daily as needed for anxiety. (Patient taking differently: Take 1 mg by mouth daily. And an additional dose if needed.) 4/7/2025 Bedtime    Melatonin 10 MG TABS tablet Take 20 mg by mouth at bedtime. 4/7/2025 Bedtime

## 2025-04-10 VITALS
DIASTOLIC BLOOD PRESSURE: 56 MMHG | HEART RATE: 71 BPM | SYSTOLIC BLOOD PRESSURE: 112 MMHG | RESPIRATION RATE: 16 BRPM | OXYGEN SATURATION: 97 % | TEMPERATURE: 97.2 F

## 2025-04-10 PROCEDURE — 250N000013 HC RX MED GY IP 250 OP 250 PS 637: Performed by: NURSE PRACTITIONER

## 2025-04-10 PROCEDURE — 250N000013 HC RX MED GY IP 250 OP 250 PS 637: Performed by: PSYCHIATRY & NEUROLOGY

## 2025-04-10 PROCEDURE — 99239 HOSP IP/OBS DSCHRG MGMT >30: CPT | Performed by: NURSE PRACTITIONER

## 2025-04-10 PROCEDURE — 250N000013 HC RX MED GY IP 250 OP 250 PS 637

## 2025-04-10 RX ORDER — NALTREXONE HYDROCHLORIDE 50 MG/1
50 TABLET, FILM COATED ORAL DAILY
Qty: 30 TABLET | Refills: 0 | Status: SHIPPED | OUTPATIENT
Start: 2025-04-10

## 2025-04-10 RX ORDER — NALTREXONE HYDROCHLORIDE 50 MG/1
50 TABLET, FILM COATED ORAL DAILY
Status: DISCONTINUED | OUTPATIENT
Start: 2025-04-10 | End: 2025-04-10 | Stop reason: HOSPADM

## 2025-04-10 RX ADMIN — LAMOTRIGINE 200 MG: 100 TABLET ORAL at 08:28

## 2025-04-10 RX ADMIN — NALTREXONE HYDROCHLORIDE 50 MG: 50 TABLET, FILM COATED ORAL at 13:50

## 2025-04-10 RX ADMIN — LORAZEPAM 1 MG: 1 TABLET ORAL at 08:28

## 2025-04-10 RX ADMIN — LORAZEPAM 1 MG: 1 TABLET ORAL at 12:49

## 2025-04-10 RX ADMIN — NICOTINE 1 PATCH: 21 PATCH, EXTENDED RELEASE TRANSDERMAL at 08:28

## 2025-04-10 ASSESSMENT — ACTIVITIES OF DAILY LIVING (ADL)
ADLS_ACUITY_SCORE: 15

## 2025-04-10 NOTE — PLAN OF CARE
Problem: Adult Behavioral Health Plan of Care  Goal: Patient-Specific Goal (Individualization)  Description: Patient will sleep 6 to 8 hours per night  Patient will eat at least 50% of meals  Patient will attend at least 50% of groups  Patient will comply with recommendations of treatment team  Patient will remain medication compliant      Outcome: Progressing     Problem: Suicide Risk  Goal: Absence of Self-Harm  Description: Patient will deny SI by discharge  Patient will remain free from self harm/injury during hospitalization  Outcome: Progressing     Problem: Infection  Goal: Absence of Infection Signs and Symptoms  Description: Patient's self inflicted wounds will heal without complication  Outcome: Progressing     Pt denies SI/HI, AH/VH, and pain. Pt endorses anxiety and depression. 1725 PRN ativan 1mg for 8/10 anxiety.  Pt is medication compliant and VSS. Pt ate 75% of dinner in lounge.  Pt has a flat  affect. Pt is calm and alert. Pt is using appropriate behavior with staff and peers. Pt spent a good part of shift out in lounge with peers. Pt had a visit with family. After visit with family Pt crying. 1915 PRN Zyprexa 10mg for agitation and restlessness. Pt ate snack in lounge.     Face to face report will be communicated to oncoming RN.    Aga Lucero RN  4/9/2025

## 2025-04-10 NOTE — PLAN OF CARE
"  Problem: Adult Behavioral Health Plan of Care  Goal: Patient-Specific Goal (Individualization)  Description: Patient will sleep 6 to 8 hours per night  Patient will eat at least 50% of meals  Patient will attend at least 50% of groups  Patient will comply with recommendations of treatment team  Patient will remain medication compliant      Outcome: Progressing   Goal Outcome Evaluation:    Plan of Care Reviewed With: patient      Patient is Alert, able to make needs known. VSS, afebrile. Assessment and vitals as charted. Denies pain.     Patient is in room at beginning of the shift. Flat, affect. Reports feeling \"down.\" But denies SI, SIB. States he was \"stupid drunk.\" When he did cut himself.   Allowed writer to assess cuts. Right anterior thigh has several cuts, sealed with glue. No drainage. Some blanchable redness around cuts, but not warm to touch. Patient reports pain, but only with pressure.   All other cuts on L thigh as well as L forearm, also have blanchable redness around, but do not appear infected. No drainage, no odor.     Patient endorses some anxiety. Requesting PRN ativan PO x 2 this shift, with effective results. Patient aware he would not have ativan rest of the evening per order.   Steady gait, no falls. Appetite good. Would like to go home today or tomorrow.   Does not attend groups.   "

## 2025-04-10 NOTE — PLAN OF CARE
Problem: Adult Behavioral Health Plan of Care  Goal: Patient-Specific Goal (Individualization)  Description: Patient will sleep 6 to 8 hours per night  Patient will eat at least 50% of meals  Patient will attend at least 50% of groups  Patient will comply with recommendations of treatment team  Patient will remain medication compliant      Outcome: Progressing  Note: Report received from Lorene. Rounding complete. Pt observed sleeping in right side lying position with regular and unlabored respirations.    Pt has been in bed with eyes closed and regular respirations. 15 minute and PRN checks all night. No complaints offered.     Pt slept approx  10  hours this NOC shift.    Face to face end of shift report communicated to oncoming RN.    Rosalie HAYWARD RN  April 10, 2025  2:10 AM          Problem: Suicide Risk  Goal: Absence of Self-Harm  Description: Patient will deny SI by discharge  Patient will remain free from self harm/injury during hospitalization  Outcome: Progressing  Note:  Pt has remained free of self-harm.       Problem: Infection  Goal: Absence of Infection Signs and Symptoms  Description: Patient's self inflicted wounds will heal without complication  Outcome: Progressing

## 2025-04-10 NOTE — PROGRESS NOTES
Pt is discharging at the recommendation of the treatment team. Pt is discharging to home transported by Elaine Roman. Pt denies having any thoughts of hurting themself or anyone else. Pt denies anxiety or depression. Pt has follow up with medication management with referral to therapy. Discharge instructions, including; demographic sheet, psychiatric evaluation, discharge summary, and AVS were faxed to these next level of care providers.     Elaine Roman will be here at 3:00pm April 10th.     [address] [place] [address]  [phone]  Medication Management: April Dior- April 14th @ 3:30pm  Provider will place referral for therapy.

## 2025-04-10 NOTE — PLAN OF CARE
Discharge Note    Patient Discharged to home on 4/10/2025 3:03 PM via [facility] accompanied by Floor [facility].     Patient informed of discharge instructions in AVS. patient verbalizes understanding and denies having any questions pertaining to AVS. Patient stable at time of discharge. Patient denies SI, HI, and thoughts of self harm at time of discharge. All personal belongings returned to patient. Discharge prescriptions sent with patient at time of discharge.    Patient states he feels ready for discharge. Denies MH criteria. Denies any issues at time of discharge.       Sybil Hernandez RN  4/10/2025  3:03 PM

## 2025-04-10 NOTE — PROGRESS NOTES
Medication management and therapy resources have been listed in pt's AVS due to pt not having insurance currently.

## 2025-04-11 NOTE — DISCHARGE SUMMARY
[facility] PSYCHIATRY  DISCHARGE SUMMARY     DISCHARGE DATA     Raphael Aceves MRN# [id number]   Age: 24 year old YOB: 2000     Date of Admission: 4/8/2025  Date of Discharge: April 10, 2025  Discharge Provider: Sheryl Castellanos NP       REASON FOR ADMISSION     This is a 24 year old male with a PMH of bipolar disorder, anxiety, and depression who presents to the ED for evaluation of SI and SIB. Patient reports having a few drinks, then going to the bathroom and cutting himself on his extremities after getting into an argument with fiance. Lacerations did not require sutures, three repaired with dermabond. Today patient denies any SI or SIB. He reports stressors that include losing job, eviction, financial strain, and then argument with fiance. He is willing to continue Lamictal for reported bipolar depression, he notes that dosing was increased to 200 mg only a day or two prior to admission. Denies any side effects. Will continue Ativan Ambien at this time. Patient does not currently have insurance due to job loss, would like to work with patient financial in applying and is also interested in reestablishing with therapist. Patient would benefit from hospitalization for further evaluation and treatment of symptoms.         DISCHARGE DIAGNOSES     #. Bipolar disorder, current episode depressed  #. Alcohol use disorder, moderate  #. Suicidal ideation       CONSULTS     None       HOSPITAL COURSE     Legal status: Voluntary    Patient was admitted to unit 5 due to the aforementioned presentation. The patient was placed under 15 minute checks to ensure patient safety. The patient participated in unit programming and groups as able.    Mr. Aceves did not require seclusion/restraint during hospitalization.     We reviewed with Mr. Aceves current and past medication trials including duration, dose, response and side effects. During this hospitalization, the following changes to the patient's  psychotropic medications were made:    PTA psychotropic medications stopped:     -none    PTA psychotropic medications continued/changed:     -Lamictal 200 mg daily (just started this dose 2-3 days ago)  -Ativan 1 mg BID prn anxiety- instructed to use one daily for 1 week, then decrease to 0.5 mg daily for 1 week, then stop.  -Lunesta 1 mg at bedtime prn    New psychotropic medications tried and stopped:     -none    New psychotropic medications initiated:     -Naltrexone 50 mg daily    Patient admitted for SI and SIB. PTA medications were continued. Lamictal was resumed, patient reported dosing recently being increased to 200 mg a few days prior, tolerating well with no reported side effects. Patient did report a desire to get off of Ativan and Lunesta. Did review safe plan to taper off of Ativan with patient who verbalized understanding. Patient does admit to increased use of alcohol recently after many months of sobriety. He was interested in starting Naltrexone to help with any urges or cravings to drink, discussed that there is a monthly injectable he could discuss with his outpatient provider about starting at next appointment. [facility] was able to provide resources on AA meetings in the area as well as provide resources for completing CD assessment should patient decide to pursue, had been offered CD assessment while here in hospital however he declined. Patient did report improvement in mood and anxiety over the course of his stay. He denied any suicidal thoughts or thoughts of SIB. He notes that he had been drinking prior to cutting and feels this made him more impulsive. Cuts all appear to be healing well, no signs of infection. Patient does report desire to maintain sobriety, plans to attend AA meetings. Patient is voluntary, is requesting to discharge home today. Patient does have follow-up scheduled with [facility] next week, plans to ask about getting established with therapy once his medical  insurance is active again. Patient will discharge this afternoon to follow-up with outpatient services as scheduled.    With these changes and supports the patient noticed improvement in their symptoms and felt sufficiently ready for discharge. As a result, Raphael Aceves was discharged to home. At the time of discharge, Raphael Aceves was determined to not be a danger to self or others. The patient was also medically stable for discharge. At the current time of discharge, the patient does not meet criteria for involuntary hospitalization. On the day of discharge, the patient reports that they do not have suicidal or homicidal ideation. Steps taken to minimize risk include: assessing patient s behavior and thought process daily during hospital stay, discharging patient with adequate plan for follow up for mental and physical health and discussing safety plan of returning to the hospital should the patient ever have thoughts of harming themselves or others. Therefore, based on all available evidence including the factors cited above, the patient does not appear to be at imminent risk for self-harm, and is appropriate for outpatient level of care. However, if patient uses substances or is medication non-adherent, their risk of decompensation and SI will be elevated. This was discussed with the patient.       DISCHARGE MEDICATIONS     Discharge Medication List as of 4/10/2025  2:59 PM        START taking these medications    Details   naltrexone (DEPADE/REVIA) 50 MG tablet Take 1 tablet (50 mg) by mouth daily., Disp-30 tablet, R-0, E-Prescribe           CONTINUE these medications which have NOT CHANGED    Details   eszopiclone (LUNESTA) 1 MG tablet Take 1 mg by mouth nightly as needed (insomnia)., Historical      lamoTRIgine (LAMICTAL) 100 MG tablet Take 200 mg by mouth every morning., Historical      LORazepam (ATIVAN) 2 MG tablet Take 1 mg by mouth daily as needed for anxiety., Historical      Melatonin 10 MG  "TABS tablet Take 20 mg by mouth at bedtime., Historical           Reason for two or more neuroleptics: not applicable        MENTAL STATUS EXAM   Vitals: /56   Pulse 71   Temp 97.2  F (36.2  C) (Temporal)   Resp 16   SpO2 97%     Appearance: Alert, oriented, dressed in hospital scrubs, appears stated age   Attitude: Cooperative, pleasant  Eye Contact: Good  Mood: \"Better\"  Affect: Full range of affect  Speech: Normal rate and rhythm   Psychomotor Behavior: No tremor, rigidity, or psychomotor abnormality   Thought Process: Logical, goal directed   Associations: No loose associations   Thought Content: Denies SI or plan. No SIB. Denies A/V hallucinations. No evidence of delusional thought.  Insight: Fair  Judgment: Fair  Oriented to: Person, place, and time  Attention Span and Concentration: Intact  Recent and Remote Memory: Intact  Language: English with appropriate syntax and vocabulary  Fund of Knowledge: Average  Muscle Strength and Tone: Grossly normal  Gait and Station: Grossly normal       TREATMENT TEAM CARE PLAN     Progress: Improved.    Continued Stay Criteria/Rationale: Discharge today.    Medical/Physical: No acute issues today.    Precautions:           Plan: Discharge    Rationale for change in precautions or plan: Discharge.    Participants: Sheryl Castellanos, NP, Nursing, SW, OT.    The patient's care was discussed with the treatment team and chart notes were reviewed.        DISCHARGE PLAN     1.  Education given regarding diagnostic and treatment options with risks, benefits and alternatives with adequate verbalization of understanding.  2.  Discharge to home. Upon detailed review of risk factors, patient amenable for release.   3.  Continue aforementioned medications and associated medication changes with follow-up by outpatient provider.  4.  Crisis management planning in place.    5.  Nursing and  to review further discharge recommendations.   6.  Patient is being " discharged with the following appointments as detailed below.      Health Care Follow-up:      [facility]  [address] [place] [address]  [phone]  Medication Management: April Dior- April 14th @ 3:30pm  Provider will place referral for therapy.        DISCHARGE SERVICES PROVIDED     40 minutes spent on discharge services, including:  Final examination of patient.  Review and discussion of hospital stay.  Instructions for continued outpatient care/goals.  Preparation of discharge records.  Preparation of medications refills and new prescriptions.  Preparation of applicable referral forms.        ATTESTATION     Sheryl Castellanos NP       LABS THIS ADMISSION     Results for orders placed or performed during the hospital encounter of 04/08/25   -Laceration Repair     Status: None    Narrative    Dennis Dillard APRN CNP     4/8/2025  4:28 PM  [facility]    -Laceration Repair    Date/Time: 4/8/2025 2:41 PM    Performed by: Dennis Dillard APRN CNP  Authorized by: Dennis Dillard APRN CNP    Risks, benefits and alternatives discussed.      ANESTHESIA (see MAR for exact dosages):     Anesthesia method:  None  LACERATION DETAILS     Location:  Leg    Leg location: Right thigh x 2 and right calf x 1.    Length (cm):  4    Depth (mm):  1    REPAIR TYPE:     Repair type:  Simple    EXPLORATION:     Hemostasis achieved with:  Direct pressure    Wound exploration: entire depth of wound probed and visualized      Wound extent: fascia not violated, no foreign body, no signs of injury,   no nerve damage, no tendon damage and no vascular damage      Contaminated: no      TREATMENT:     Area cleansed with:  Soap and water    Amount of cleaning:  Standard    Visualized foreign bodies/material removed: no      SKIN REPAIR     Repair method:  Tissue adhesive    APPROXIMATION     Approximation:  Close    POST-PROCEDURE DETAILS     Dressing:  Open (no dressing)      PROCEDURE    Patient  Tolerance:  Patient tolerated the procedure well with no immediate   complications   CBC with platelets     Status: Abnormal   Result Value Ref Range    WBC Count 15.6 (H) 4.0 - 11.0 10e3/uL    RBC Count 5.09 4.40 - 5.90 10e6/uL    Hemoglobin 15.4 13.3 - 17.7 g/dL    Hematocrit 44.5 40.0 - 53.0 %    MCV 87 78 - 100 fL    MCH 30.3 26.5 - 33.0 pg    MCHC 34.6 31.5 - 36.5 g/dL    RDW 12.5 10.0 - 15.0 %    Platelet Count 362 150 - 450 10e3/uL   Comprehensive metabolic panel     Status: Normal   Result Value Ref Range    Sodium 140 135 - 145 mmol/L    Potassium 3.6 3.4 - 5.3 mmol/L    Carbon Dioxide (CO2) 25 22 - 29 mmol/L    Anion Gap 13 7 - 15 mmol/L    Urea Nitrogen 10.3 6.0 - 20.0 mg/dL    Creatinine 1.03 0.67 - 1.17 mg/dL    GFR Estimate >90 >60 mL/min/1.73m2    Calcium 9.4 8.8 - 10.4 mg/dL    Chloride 102 98 - 107 mmol/L    Glucose 97 70 - 99 mg/dL    Alkaline Phosphatase 78 40 - 150 U/L    AST 36 0 - 45 U/L    ALT 21 0 - 70 U/L    Protein Total 8.3 6.4 - 8.3 g/dL    Albumin 5.2 3.5 - 5.2 g/dL    Bilirubin Total 0.4 <=1.2 mg/dL   Ethyl Alcohol Level     Status: Normal   Result Value Ref Range    Alcohol ethyl <0.01 <=0.01 g/dL   Extra Tube     Status: None    Narrative    The following orders were created for panel order Extra Tube.  Procedure                               Abnormality         Status                     ---------                               -----------         ------                     Extra Blue Top Tube[[id number]]                             Final result               Extra Red Top Tube[[id number]]                              Final result               Extra Heparinized Syringe[[id number]]                       Final result                 Please view results for these tests on the individual orders.   Extra Blue Top Tube     Status: None   Result Value Ref Range    Hold Specimen [facility]    Extra Red Top Tube     Status: None   Result Value Ref Range    Hold Specimen [facility]    Extra Heparinized  Syringe     Status: None   Result Value Ref Range    Hold Specimen [facility]    Urine Drug Screen Panel     Status: Abnormal   Result Value Ref Range    Amphetamines Urine Screen Negative Screen Negative    Barbituates Urine Screen Negative Screen Negative    Benzodiazepine Urine Screen Positive (A) Screen Negative    Cannabinoids Urine Screen Positive (A) Screen Negative    Cocaine Urine Screen Negative Screen Negative    Fentanyl Qual Urine Screen Negative Screen Negative    Opiates Urine Screen Negative Screen Negative    PCP Urine Screen Negative Screen Negative   Urine Drug Screen     Status: Abnormal    Narrative    The following orders were created for panel order Urine Drug Screen.  Procedure                               Abnormality         Status                     ---------                               -----------         ------                     Urine Drug Screen Panel[[id number]]     Abnormal            Final result                 Please view results for these tests on the individual orders.

## 2025-09-03 ENCOUNTER — HOSPITAL ENCOUNTER (OUTPATIENT)
Facility: HOSPITAL | Age: 25
Setting detail: OBSERVATION
Discharge: HOME | End: 2025-09-04
Attending: PHYSICIAN ASSISTANT | Admitting: SURGERY

## 2025-09-03 ENCOUNTER — ANESTHESIA EVENT (OUTPATIENT)
Dept: SURGERY | Facility: HOSPITAL | Age: 25
End: 2025-09-03

## 2025-09-03 ENCOUNTER — ANESTHESIA (OUTPATIENT)
Dept: SURGERY | Facility: HOSPITAL | Age: 25
End: 2025-09-03

## 2025-09-03 DIAGNOSIS — K35.80 ACUTE APPENDICITIS: Primary | ICD-10-CM

## 2025-09-03 DIAGNOSIS — K35.200 ACUTE APPENDICITIS WITH GENERALIZED PERITONITIS WITHOUT GANGRENE, PERFORATION, OR ABSCESS: ICD-10-CM

## 2025-09-03 DIAGNOSIS — G89.18 ACUTE POST-OPERATIVE PAIN: ICD-10-CM

## 2025-09-03 PROBLEM — K35.219 ACUTE APPENDICITIS WITH GENERALIZED PERITONITIS AND ABSCESS: Status: ACTIVE | Noted: 2025-09-03

## 2025-09-03 LAB
ALBUMIN SERPL BCG-MCNC: 4.9 G/DL (ref 3.5–5.2)
ALBUMIN UR-MCNC: 10 MG/DL
ALP SERPL-CCNC: 69 U/L (ref 40–150)
ALT SERPL W P-5'-P-CCNC: 42 U/L (ref 0–70)
ANION GAP SERPL CALCULATED.3IONS-SCNC: 14 MMOL/L (ref 7–15)
APPEARANCE UR: CLEAR
AST SERPL W P-5'-P-CCNC: 42 U/L (ref 0–45)
BASOPHILS # BLD AUTO: 0.04 10E3/UL (ref 0–0.2)
BASOPHILS NFR BLD AUTO: 0.2 %
BILIRUB SERPL-MCNC: 0.7 MG/DL
BILIRUB UR QL STRIP: NEGATIVE
BUN SERPL-MCNC: 15.5 MG/DL (ref 6–20)
CALCIUM SERPL-MCNC: 10 MG/DL (ref 8.8–10.4)
CHLORIDE SERPL-SCNC: 102 MMOL/L (ref 98–107)
COLOR UR AUTO: ABNORMAL
CREAT SERPL-MCNC: 0.92 MG/DL (ref 0.67–1.17)
CRP SERPL-MCNC: 15.08 MG/L
EGFRCR SERPLBLD CKD-EPI 2021: >90 ML/MIN/1.73M2
EOSINOPHIL # BLD AUTO: 0.11 10E3/UL (ref 0–0.7)
EOSINOPHIL NFR BLD AUTO: 0.6 %
ERYTHROCYTE [DISTWIDTH] IN BLOOD BY AUTOMATED COUNT: 12.5 % (ref 10–15)
GLUCOSE SERPL-MCNC: 104 MG/DL (ref 70–99)
GLUCOSE UR STRIP-MCNC: NEGATIVE MG/DL
HCO3 SERPL-SCNC: 24 MMOL/L (ref 22–29)
HCT VFR BLD AUTO: 44.3 % (ref 40–53)
HGB BLD-MCNC: 15.1 G/DL (ref 13.3–17.7)
HGB UR QL STRIP: ABNORMAL
HOLD SPECIMEN: NORMAL
HYALINE CASTS: 2 /LPF
IMM GRANULOCYTES # BLD: 0.07 10E3/UL
IMM GRANULOCYTES NFR BLD: 0.4 %
KETONES UR STRIP-MCNC: NEGATIVE MG/DL
LEUKOCYTE ESTERASE UR QL STRIP: NEGATIVE
LYMPHOCYTES # BLD AUTO: 1.69 10E3/UL (ref 0.8–5.3)
LYMPHOCYTES NFR BLD AUTO: 9.9 %
MCH RBC QN AUTO: 30.3 PG (ref 26.5–33)
MCHC RBC AUTO-ENTMCNC: 34.1 G/DL (ref 31.5–36.5)
MCV RBC AUTO: 88.8 FL (ref 78–100)
MONOCYTES # BLD AUTO: 1.42 10E3/UL (ref 0–1.3)
MONOCYTES NFR BLD AUTO: 8.3 %
MUCOUS THREADS #/AREA URNS LPF: PRESENT /LPF
NEUTROPHILS # BLD AUTO: 13.79 10E3/UL (ref 1.6–8.3)
NEUTROPHILS NFR BLD AUTO: 80.6 %
NITRATE UR QL: NEGATIVE
NRBC # BLD AUTO: <0.03 10E3/UL
NRBC BLD AUTO-RTO: 0 /100
PH UR STRIP: 8 [PH] (ref 4.7–8)
PLATELET # BLD AUTO: 315 10E3/UL (ref 150–450)
POTASSIUM SERPL-SCNC: 4.6 MMOL/L (ref 3.4–5.3)
PROT SERPL-MCNC: 7.6 G/DL (ref 6.4–8.3)
RBC # BLD AUTO: 4.99 10E6/UL (ref 4.4–5.9)
RBC URINE: 8 /HPF
SODIUM SERPL-SCNC: 140 MMOL/L (ref 135–145)
SP GR UR STRIP: 1.02 (ref 1–1.03)
SQUAMOUS EPITHELIAL: 0 /HPF
UROBILINOGEN UR STRIP-MCNC: NORMAL MG/DL
WBC # BLD AUTO: 17.12 10E3/UL (ref 4–11)
WBC URINE: 3 /HPF

## 2025-09-03 PROCEDURE — 82374 ASSAY BLOOD CARBON DIOXIDE: CPT | Performed by: PHYSICIAN ASSISTANT

## 2025-09-03 PROCEDURE — 96374 THER/PROPH/DIAG INJ IV PUSH: CPT

## 2025-09-03 PROCEDURE — 99285 EMERGENCY DEPT VISIT HI MDM: CPT | Performed by: PHYSICIAN ASSISTANT

## 2025-09-03 PROCEDURE — 96375 TX/PRO/DX INJ NEW DRUG ADDON: CPT

## 2025-09-03 PROCEDURE — 258N000003 HC RX IP 258 OP 636: Performed by: NURSE ANESTHETIST, CERTIFIED REGISTERED

## 2025-09-03 PROCEDURE — G0378 HOSPITAL OBSERVATION PER HR: HCPCS

## 2025-09-03 PROCEDURE — 255N000002 HC RX 255 OP 636: Performed by: PHYSICIAN ASSISTANT

## 2025-09-03 PROCEDURE — 258N000003 HC RX IP 258 OP 636: Performed by: SURGERY

## 2025-09-03 PROCEDURE — 99285 EMERGENCY DEPT VISIT HI MDM: CPT | Mod: 25 | Performed by: PHYSICIAN ASSISTANT

## 2025-09-03 PROCEDURE — 36415 COLL VENOUS BLD VENIPUNCTURE: CPT | Performed by: PHYSICIAN ASSISTANT

## 2025-09-03 PROCEDURE — 81001 URINALYSIS AUTO W/SCOPE: CPT | Performed by: PHYSICIAN ASSISTANT

## 2025-09-03 PROCEDURE — 250N000013 HC RX MED GY IP 250 OP 250 PS 637: Performed by: SURGERY

## 2025-09-03 PROCEDURE — 250N000011 HC RX IP 250 OP 636: Performed by: NURSE ANESTHETIST, CERTIFIED REGISTERED

## 2025-09-03 PROCEDURE — 86140 C-REACTIVE PROTEIN: CPT | Performed by: PHYSICIAN ASSISTANT

## 2025-09-03 PROCEDURE — 85014 HEMATOCRIT: CPT | Performed by: PHYSICIAN ASSISTANT

## 2025-09-03 PROCEDURE — 250N000011 HC RX IP 250 OP 636: Performed by: PHYSICIAN ASSISTANT

## 2025-09-03 PROCEDURE — 250N000011 HC RX IP 250 OP 636: Performed by: SURGERY

## 2025-09-03 PROCEDURE — 250N000009 HC RX 250: Performed by: NURSE ANESTHETIST, CERTIFIED REGISTERED

## 2025-09-03 RX ORDER — LABETALOL HYDROCHLORIDE 5 MG/ML
10 INJECTION, SOLUTION INTRAVENOUS
Status: DISCONTINUED | OUTPATIENT
Start: 2025-09-03 | End: 2025-09-03 | Stop reason: HOSPADM

## 2025-09-03 RX ORDER — NALOXONE HYDROCHLORIDE 0.4 MG/ML
0.4 INJECTION, SOLUTION INTRAMUSCULAR; INTRAVENOUS; SUBCUTANEOUS
Status: DISCONTINUED | OUTPATIENT
Start: 2025-09-03 | End: 2025-09-04 | Stop reason: HOSPADM

## 2025-09-03 RX ORDER — ONDANSETRON 2 MG/ML
4 INJECTION INTRAMUSCULAR; INTRAVENOUS EVERY 6 HOURS PRN
Status: DISCONTINUED | OUTPATIENT
Start: 2025-09-03 | End: 2025-09-04 | Stop reason: HOSPADM

## 2025-09-03 RX ORDER — FENTANYL CITRATE 50 UG/ML
INJECTION, SOLUTION INTRAMUSCULAR; INTRAVENOUS PRN
Status: DISCONTINUED | OUTPATIENT
Start: 2025-09-03 | End: 2025-09-03

## 2025-09-03 RX ORDER — PROPOFOL 10 MG/ML
INJECTION, EMULSION INTRAVENOUS PRN
Status: DISCONTINUED | OUTPATIENT
Start: 2025-09-03 | End: 2025-09-03

## 2025-09-03 RX ORDER — LIDOCAINE HYDROCHLORIDE 20 MG/ML
INJECTION, SOLUTION INFILTRATION; PERINEURAL PRN
Status: DISCONTINUED | OUTPATIENT
Start: 2025-09-03 | End: 2025-09-03

## 2025-09-03 RX ORDER — ONDANSETRON 2 MG/ML
4 INJECTION INTRAMUSCULAR; INTRAVENOUS EVERY 30 MIN PRN
Status: DISCONTINUED | OUTPATIENT
Start: 2025-09-03 | End: 2025-09-03 | Stop reason: HOSPADM

## 2025-09-03 RX ORDER — OXYCODONE HYDROCHLORIDE 5 MG/1
5 TABLET ORAL EVERY 4 HOURS PRN
Status: DISCONTINUED | OUTPATIENT
Start: 2025-09-03 | End: 2025-09-03

## 2025-09-03 RX ORDER — AMOXICILLIN 250 MG
2 CAPSULE ORAL 2 TIMES DAILY PRN
Status: DISCONTINUED | OUTPATIENT
Start: 2025-09-03 | End: 2025-09-04 | Stop reason: HOSPADM

## 2025-09-03 RX ORDER — KETOROLAC TROMETHAMINE 30 MG/ML
30 INJECTION, SOLUTION INTRAMUSCULAR; INTRAVENOUS EVERY 6 HOURS PRN
Status: DISCONTINUED | OUTPATIENT
Start: 2025-09-03 | End: 2025-09-04 | Stop reason: HOSPADM

## 2025-09-03 RX ORDER — ACETAMINOPHEN 650 MG/1
650 SUPPOSITORY RECTAL EVERY 4 HOURS PRN
Status: DISCONTINUED | OUTPATIENT
Start: 2025-09-03 | End: 2025-09-04 | Stop reason: HOSPADM

## 2025-09-03 RX ORDER — OXYCODONE HYDROCHLORIDE 5 MG/1
5 TABLET ORAL EVERY 4 HOURS PRN
Refills: 0 | Status: DISCONTINUED | OUTPATIENT
Start: 2025-09-03 | End: 2025-09-04 | Stop reason: HOSPADM

## 2025-09-03 RX ORDER — SODIUM CHLORIDE 9 MG/ML
INJECTION, SOLUTION INTRAVENOUS CONTINUOUS
Status: DISCONTINUED | OUTPATIENT
Start: 2025-09-03 | End: 2025-09-04 | Stop reason: HOSPADM

## 2025-09-03 RX ORDER — HYDROMORPHONE HCL IN WATER/PF 6 MG/30 ML
0.2 PATIENT CONTROLLED ANALGESIA SYRINGE INTRAVENOUS
Status: DISCONTINUED | OUTPATIENT
Start: 2025-09-03 | End: 2025-09-03

## 2025-09-03 RX ORDER — SODIUM CHLORIDE, SODIUM LACTATE, POTASSIUM CHLORIDE, CALCIUM CHLORIDE 600; 310; 30; 20 MG/100ML; MG/100ML; MG/100ML; MG/100ML
INJECTION, SOLUTION INTRAVENOUS CONTINUOUS
Status: DISCONTINUED | OUTPATIENT
Start: 2025-09-03 | End: 2025-09-03 | Stop reason: HOSPADM

## 2025-09-03 RX ORDER — ONDANSETRON 4 MG/1
4 TABLET, ORALLY DISINTEGRATING ORAL EVERY 30 MIN PRN
Status: DISCONTINUED | OUTPATIENT
Start: 2025-09-03 | End: 2025-09-03 | Stop reason: HOSPADM

## 2025-09-03 RX ORDER — ONDANSETRON 4 MG/1
4 TABLET, ORALLY DISINTEGRATING ORAL EVERY 6 HOURS PRN
Status: DISCONTINUED | OUTPATIENT
Start: 2025-09-03 | End: 2025-09-04 | Stop reason: HOSPADM

## 2025-09-03 RX ORDER — NALOXONE HYDROCHLORIDE 0.4 MG/ML
0.2 INJECTION, SOLUTION INTRAMUSCULAR; INTRAVENOUS; SUBCUTANEOUS
Status: DISCONTINUED | OUTPATIENT
Start: 2025-09-03 | End: 2025-09-04 | Stop reason: HOSPADM

## 2025-09-03 RX ORDER — ONDANSETRON 2 MG/ML
INJECTION INTRAMUSCULAR; INTRAVENOUS PRN
Status: DISCONTINUED | OUTPATIENT
Start: 2025-09-03 | End: 2025-09-03

## 2025-09-03 RX ORDER — DEXAMETHASONE SODIUM PHOSPHATE 4 MG/ML
INJECTION, SOLUTION INTRA-ARTICULAR; INTRALESIONAL; INTRAMUSCULAR; INTRAVENOUS; SOFT TISSUE PRN
Status: DISCONTINUED | OUTPATIENT
Start: 2025-09-03 | End: 2025-09-03

## 2025-09-03 RX ORDER — HYDROMORPHONE HYDROCHLORIDE 1 MG/ML
0.5 INJECTION, SOLUTION INTRAMUSCULAR; INTRAVENOUS; SUBCUTANEOUS ONCE
Refills: 0 | Status: COMPLETED | OUTPATIENT
Start: 2025-09-03 | End: 2025-09-03

## 2025-09-03 RX ORDER — DEXAMETHASONE SODIUM PHOSPHATE 10 MG/ML
4 INJECTION, SOLUTION INTRAMUSCULAR; INTRAVENOUS
Status: DISCONTINUED | OUTPATIENT
Start: 2025-09-03 | End: 2025-09-03 | Stop reason: HOSPADM

## 2025-09-03 RX ORDER — KETOROLAC TROMETHAMINE 30 MG/ML
30 INJECTION, SOLUTION INTRAMUSCULAR; INTRAVENOUS ONCE
Status: COMPLETED | OUTPATIENT
Start: 2025-09-03 | End: 2025-09-03

## 2025-09-03 RX ORDER — PIPERACILLIN SODIUM, TAZOBACTAM SODIUM 3; .375 G/15ML; G/15ML
3.38 INJECTION, POWDER, LYOPHILIZED, FOR SOLUTION INTRAVENOUS ONCE
Status: COMPLETED | OUTPATIENT
Start: 2025-09-03 | End: 2025-09-03

## 2025-09-03 RX ORDER — SODIUM CHLORIDE, SODIUM LACTATE, POTASSIUM CHLORIDE, CALCIUM CHLORIDE 600; 310; 30; 20 MG/100ML; MG/100ML; MG/100ML; MG/100ML
INJECTION, SOLUTION INTRAVENOUS CONTINUOUS PRN
Status: DISCONTINUED | OUTPATIENT
Start: 2025-09-03 | End: 2025-09-03

## 2025-09-03 RX ORDER — ALBUTEROL SULFATE 0.83 MG/ML
2.5 SOLUTION RESPIRATORY (INHALATION) EVERY 4 HOURS PRN
Status: DISCONTINUED | OUTPATIENT
Start: 2025-09-03 | End: 2025-09-03 | Stop reason: HOSPADM

## 2025-09-03 RX ORDER — HYDROMORPHONE HCL IN WATER/PF 6 MG/30 ML
0.4 PATIENT CONTROLLED ANALGESIA SYRINGE INTRAVENOUS
Status: DISCONTINUED | OUTPATIENT
Start: 2025-09-03 | End: 2025-09-03

## 2025-09-03 RX ORDER — ACETAMINOPHEN 325 MG/1
650 TABLET ORAL EVERY 4 HOURS PRN
Status: DISCONTINUED | OUTPATIENT
Start: 2025-09-03 | End: 2025-09-04 | Stop reason: HOSPADM

## 2025-09-03 RX ORDER — HYDROMORPHONE HYDROCHLORIDE 1 MG/ML
0.2 INJECTION, SOLUTION INTRAMUSCULAR; INTRAVENOUS; SUBCUTANEOUS EVERY 5 MIN PRN
Status: DISCONTINUED | OUTPATIENT
Start: 2025-09-03 | End: 2025-09-03 | Stop reason: HOSPADM

## 2025-09-03 RX ORDER — AMOXICILLIN 250 MG
1 CAPSULE ORAL 2 TIMES DAILY PRN
Status: DISCONTINUED | OUTPATIENT
Start: 2025-09-03 | End: 2025-09-04 | Stop reason: HOSPADM

## 2025-09-03 RX ORDER — HYDROMORPHONE HYDROCHLORIDE 1 MG/ML
0.4 INJECTION, SOLUTION INTRAMUSCULAR; INTRAVENOUS; SUBCUTANEOUS EVERY 5 MIN PRN
Status: DISCONTINUED | OUTPATIENT
Start: 2025-09-03 | End: 2025-09-03 | Stop reason: HOSPADM

## 2025-09-03 RX ORDER — HYDROMORPHONE HCL IN WATER/PF 6 MG/30 ML
0.4 PATIENT CONTROLLED ANALGESIA SYRINGE INTRAVENOUS
Status: DISCONTINUED | OUTPATIENT
Start: 2025-09-03 | End: 2025-09-04 | Stop reason: HOSPADM

## 2025-09-03 RX ORDER — FENTANYL CITRATE 50 UG/ML
25 INJECTION, SOLUTION INTRAMUSCULAR; INTRAVENOUS EVERY 5 MIN PRN
Status: DISCONTINUED | OUTPATIENT
Start: 2025-09-03 | End: 2025-09-03 | Stop reason: HOSPADM

## 2025-09-03 RX ORDER — FENTANYL CITRATE 50 UG/ML
50 INJECTION, SOLUTION INTRAMUSCULAR; INTRAVENOUS EVERY 5 MIN PRN
Status: DISCONTINUED | OUTPATIENT
Start: 2025-09-03 | End: 2025-09-03 | Stop reason: HOSPADM

## 2025-09-03 RX ORDER — NALOXONE HYDROCHLORIDE 0.4 MG/ML
0.1 INJECTION, SOLUTION INTRAMUSCULAR; INTRAVENOUS; SUBCUTANEOUS
Status: DISCONTINUED | OUTPATIENT
Start: 2025-09-03 | End: 2025-09-03 | Stop reason: HOSPADM

## 2025-09-03 RX ORDER — DEXMEDETOMIDINE HYDROCHLORIDE 4 UG/ML
INJECTION, SOLUTION INTRAVENOUS PRN
Status: DISCONTINUED | OUTPATIENT
Start: 2025-09-03 | End: 2025-09-03

## 2025-09-03 RX ORDER — PROCHLORPERAZINE MALEATE 5 MG/1
10 TABLET ORAL EVERY 6 HOURS PRN
Status: DISCONTINUED | OUTPATIENT
Start: 2025-09-03 | End: 2025-09-04 | Stop reason: HOSPADM

## 2025-09-03 RX ORDER — HYDROMORPHONE HCL IN WATER/PF 6 MG/30 ML
0.2 PATIENT CONTROLLED ANALGESIA SYRINGE INTRAVENOUS
Status: DISCONTINUED | OUTPATIENT
Start: 2025-09-03 | End: 2025-09-04 | Stop reason: HOSPADM

## 2025-09-03 RX ORDER — PIPERACILLIN SODIUM, TAZOBACTAM SODIUM 3; .375 G/15ML; G/15ML
3.38 INJECTION, POWDER, LYOPHILIZED, FOR SOLUTION INTRAVENOUS EVERY 6 HOURS
Status: DISCONTINUED | OUTPATIENT
Start: 2025-09-04 | End: 2025-09-04

## 2025-09-03 RX ORDER — BUPIVACAINE HYDROCHLORIDE 5 MG/ML
INJECTION, SOLUTION PERINEURAL PRN
Status: DISCONTINUED | OUTPATIENT
Start: 2025-09-03 | End: 2025-09-03 | Stop reason: HOSPADM

## 2025-09-03 RX ADMIN — KETOROLAC TROMETHAMINE 30 MG: 30 INJECTION, SOLUTION INTRAMUSCULAR at 17:20

## 2025-09-03 RX ADMIN — Medication 50 MG: at 20:02

## 2025-09-03 RX ADMIN — SUGAMMADEX 150 MG: 100 INJECTION, SOLUTION INTRAVENOUS at 20:55

## 2025-09-03 RX ADMIN — PIPERACILLIN AND TAZOBACTAM 3.38 G: 3; .375 INJECTION, POWDER, FOR SOLUTION INTRAVENOUS at 18:38

## 2025-09-03 RX ADMIN — HYDROMORPHONE HYDROCHLORIDE 0.5 MG: 1 INJECTION, SOLUTION INTRAMUSCULAR; INTRAVENOUS; SUBCUTANEOUS at 18:38

## 2025-09-03 RX ADMIN — IOHEXOL 80 ML: 350 INJECTION, SOLUTION INTRAVENOUS at 17:42

## 2025-09-03 RX ADMIN — Medication 100 MG: at 19:47

## 2025-09-03 RX ADMIN — DEXMEDETOMIDINE HYDROCHLORIDE 8 MCG: 4 INJECTION, SOLUTION INTRAVENOUS at 19:51

## 2025-09-03 RX ADMIN — HYDROMORPHONE HYDROCHLORIDE 0.5 MG: 1 INJECTION, SOLUTION INTRAMUSCULAR; INTRAVENOUS; SUBCUTANEOUS at 20:02

## 2025-09-03 RX ADMIN — SODIUM CHLORIDE, SODIUM LACTATE, POTASSIUM CHLORIDE, AND CALCIUM CHLORIDE: .6; .31; .03; .02 INJECTION, SOLUTION INTRAVENOUS at 19:40

## 2025-09-03 RX ADMIN — ONDANSETRON 4 MG: 2 INJECTION INTRAMUSCULAR; INTRAVENOUS at 20:38

## 2025-09-03 RX ADMIN — FENTANYL CITRATE 50 MCG: 50 INJECTION INTRAMUSCULAR; INTRAVENOUS at 19:44

## 2025-09-03 RX ADMIN — DEXMEDETOMIDINE HYDROCHLORIDE 8 MCG: 4 INJECTION, SOLUTION INTRAVENOUS at 19:58

## 2025-09-03 RX ADMIN — FENTANYL CITRATE 50 MCG: 50 INJECTION INTRAMUSCULAR; INTRAVENOUS at 20:02

## 2025-09-03 RX ADMIN — SODIUM CHLORIDE: 9 INJECTION, SOLUTION INTRAVENOUS at 22:44

## 2025-09-03 RX ADMIN — PROPOFOL 200 MG: 10 INJECTION, EMULSION INTRAVENOUS at 19:46

## 2025-09-03 RX ADMIN — DEXAMETHASONE SODIUM PHOSPHATE 8 MG: 4 INJECTION, SOLUTION INTRA-ARTICULAR; INTRALESIONAL; INTRAMUSCULAR; INTRAVENOUS; SOFT TISSUE at 19:52

## 2025-09-03 RX ADMIN — MIDAZOLAM 2 MG: 1 INJECTION INTRAMUSCULAR; INTRAVENOUS at 19:41

## 2025-09-03 RX ADMIN — OXYCODONE HYDROCHLORIDE 5 MG: 5 TABLET ORAL at 22:48

## 2025-09-03 RX ADMIN — HYDROMORPHONE HYDROCHLORIDE 0.2 MG: 0.2 INJECTION, SOLUTION INTRAMUSCULAR; INTRAVENOUS; SUBCUTANEOUS at 23:42

## 2025-09-03 RX ADMIN — LIDOCAINE HYDROCHLORIDE 80 MG: 20 INJECTION, SOLUTION INFILTRATION; PERINEURAL at 19:45

## 2025-09-03 ASSESSMENT — ENCOUNTER SYMPTOMS: ROS GI COMMENTS: SEE HPI

## 2025-09-03 ASSESSMENT — ACTIVITIES OF DAILY LIVING (ADL)
ADLS_ACUITY_SCORE: 41

## 2025-09-03 ASSESSMENT — COLUMBIA-SUICIDE SEVERITY RATING SCALE - C-SSRS
1. IN THE PAST MONTH, HAVE YOU WISHED YOU WERE DEAD OR WISHED YOU COULD GO TO SLEEP AND NOT WAKE UP?: NO
6. HAVE YOU EVER DONE ANYTHING, STARTED TO DO ANYTHING, OR PREPARED TO DO ANYTHING TO END YOUR LIFE?: NO
2. HAVE YOU ACTUALLY HAD ANY THOUGHTS OF KILLING YOURSELF IN THE PAST MONTH?: NO

## 2025-09-03 ASSESSMENT — LIFESTYLE VARIABLES: TOBACCO_USE: 1

## 2025-09-04 VITALS
TEMPERATURE: 97.9 F | DIASTOLIC BLOOD PRESSURE: 83 MMHG | OXYGEN SATURATION: 97 % | RESPIRATION RATE: 16 BRPM | HEART RATE: 96 BPM | SYSTOLIC BLOOD PRESSURE: 136 MMHG | WEIGHT: 155.2 LBS | BODY MASS INDEX: 25.05 KG/M2

## 2025-09-04 LAB
ERYTHROCYTE [DISTWIDTH] IN BLOOD BY AUTOMATED COUNT: 12.5 % (ref 10–15)
HCT VFR BLD AUTO: 36.3 % (ref 40–53)
HGB BLD-MCNC: 12.3 G/DL (ref 13.3–17.7)
HOLD SPECIMEN: NORMAL
MCH RBC QN AUTO: 30.1 PG (ref 26.5–33)
MCHC RBC AUTO-ENTMCNC: 33.9 G/DL (ref 31.5–36.5)
MCV RBC AUTO: 89 FL (ref 78–100)
PLATELET # BLD AUTO: 306 10E3/UL (ref 150–450)
RBC # BLD AUTO: 4.08 10E6/UL (ref 4.4–5.9)
WBC # BLD AUTO: 17.6 10E3/UL (ref 4–11)

## 2025-09-04 PROCEDURE — 250N000011 HC RX IP 250 OP 636: Performed by: SURGERY

## 2025-09-04 PROCEDURE — 85048 AUTOMATED LEUKOCYTE COUNT: CPT | Performed by: SURGERY

## 2025-09-04 PROCEDURE — G0378 HOSPITAL OBSERVATION PER HR: HCPCS

## 2025-09-04 PROCEDURE — 250N000013 HC RX MED GY IP 250 OP 250 PS 637: Performed by: SURGERY

## 2025-09-04 PROCEDURE — 36415 COLL VENOUS BLD VENIPUNCTURE: CPT | Performed by: SURGERY

## 2025-09-04 RX ORDER — IBUPROFEN 600 MG/1
600 TABLET, FILM COATED ORAL EVERY 6 HOURS PRN
Qty: 30 TABLET | Refills: 0 | Status: SHIPPED | OUTPATIENT
Start: 2025-09-04

## 2025-09-04 RX ORDER — PIPERACILLIN SODIUM, TAZOBACTAM SODIUM 4; .5 G/20ML; G/20ML
4.5 INJECTION, POWDER, LYOPHILIZED, FOR SOLUTION INTRAVENOUS EVERY 6 HOURS
Status: DISCONTINUED | OUTPATIENT
Start: 2025-09-04 | End: 2025-09-04 | Stop reason: HOSPADM

## 2025-09-04 RX ORDER — AMOXICILLIN 250 MG
1 CAPSULE ORAL 2 TIMES DAILY
Qty: 30 TABLET | Refills: 0 | Status: SHIPPED | OUTPATIENT
Start: 2025-09-04

## 2025-09-04 RX ORDER — OXYCODONE HYDROCHLORIDE 5 MG/1
5 TABLET ORAL EVERY 4 HOURS PRN
Qty: 13 TABLET | Refills: 0 | Status: SHIPPED | OUTPATIENT
Start: 2025-09-04

## 2025-09-04 RX ORDER — ACETAMINOPHEN 325 MG/1
650 TABLET ORAL EVERY 4 HOURS PRN
Qty: 30 TABLET | Refills: 0 | Status: SHIPPED | OUTPATIENT
Start: 2025-09-04

## 2025-09-04 RX ADMIN — HYDROMORPHONE HYDROCHLORIDE 0.4 MG: 0.2 INJECTION, SOLUTION INTRAMUSCULAR; INTRAVENOUS; SUBCUTANEOUS at 03:32

## 2025-09-04 RX ADMIN — PIPERACILLIN AND TAZOBACTAM 3.38 G: 3; .375 INJECTION, POWDER, FOR SOLUTION INTRAVENOUS at 06:51

## 2025-09-04 RX ADMIN — PIPERACILLIN AND TAZOBACTAM 3.38 G: 3; .375 INJECTION, POWDER, FOR SOLUTION INTRAVENOUS at 01:08

## 2025-09-04 RX ADMIN — HYDROMORPHONE HYDROCHLORIDE 0.4 MG: 0.2 INJECTION, SOLUTION INTRAMUSCULAR; INTRAVENOUS; SUBCUTANEOUS at 05:38

## 2025-09-04 RX ADMIN — OXYCODONE HYDROCHLORIDE 5 MG: 5 TABLET ORAL at 06:49

## 2025-09-04 RX ADMIN — OXYCODONE HYDROCHLORIDE 5 MG: 5 TABLET ORAL at 11:08

## 2025-09-04 ASSESSMENT — ACTIVITIES OF DAILY LIVING (ADL)
ADLS_ACUITY_SCORE: 20
ADLS_ACUITY_SCORE: 20
ADLS_ACUITY_SCORE: 21
ADLS_ACUITY_SCORE: 20
ADLS_ACUITY_SCORE: 21
ADLS_ACUITY_SCORE: 21
ADLS_ACUITY_SCORE: 20

## (undated) RX ORDER — PROPOFOL 10 MG/ML
INJECTION, EMULSION INTRAVENOUS
Status: DISPENSED
Start: 2025-09-03

## (undated) RX ORDER — FENTANYL CITRATE 50 UG/ML
INJECTION, SOLUTION INTRAMUSCULAR; INTRAVENOUS
Status: DISPENSED
Start: 2025-09-03

## (undated) RX ORDER — DEXMEDETOMIDINE HYDROCHLORIDE 4 UG/ML
INJECTION, SOLUTION INTRAVENOUS
Status: DISPENSED
Start: 2025-09-03

## (undated) RX ORDER — METOPROLOL TARTRATE 1 MG/ML
INJECTION, SOLUTION INTRAVENOUS
Status: DISPENSED
Start: 2025-09-03

## (undated) RX ORDER — DEXAMETHASONE SODIUM PHOSPHATE 10 MG/ML
INJECTION, SOLUTION INTRAMUSCULAR; INTRAVENOUS
Status: DISPENSED
Start: 2025-09-03

## (undated) RX ORDER — ONDANSETRON 2 MG/ML
INJECTION INTRAMUSCULAR; INTRAVENOUS
Status: DISPENSED
Start: 2025-09-03

## (undated) RX ORDER — HYDROMORPHONE HYDROCHLORIDE 1 MG/ML
INJECTION, SOLUTION INTRAMUSCULAR; INTRAVENOUS; SUBCUTANEOUS
Status: DISPENSED
Start: 2025-09-03

## (undated) RX ORDER — FENTANYL CITRATE-0.9 % NACL/PF 10 MCG/ML
PLASTIC BAG, INJECTION (ML) INTRAVENOUS
Status: DISPENSED
Start: 2025-09-03